# Patient Record
Sex: FEMALE | Race: WHITE | NOT HISPANIC OR LATINO | URBAN - METROPOLITAN AREA
[De-identification: names, ages, dates, MRNs, and addresses within clinical notes are randomized per-mention and may not be internally consistent; named-entity substitution may affect disease eponyms.]

---

## 2017-08-07 ENCOUNTER — OFFICE VISIT (OUTPATIENT)
Dept: FAMILY MEDICINE | Facility: CLINIC | Age: 22
End: 2017-08-07
Payer: OTHER GOVERNMENT

## 2017-08-07 VITALS
OXYGEN SATURATION: 100 % | DIASTOLIC BLOOD PRESSURE: 76 MMHG | WEIGHT: 133.8 LBS | HEIGHT: 64 IN | BODY MASS INDEX: 22.84 KG/M2 | HEART RATE: 86 BPM | TEMPERATURE: 97.6 F | SYSTOLIC BLOOD PRESSURE: 128 MMHG

## 2017-08-07 DIAGNOSIS — F41.9 ANXIETY: ICD-10-CM

## 2017-08-07 DIAGNOSIS — Z00.00 ROUTINE GENERAL MEDICAL EXAMINATION AT A HEALTH CARE FACILITY: Primary | ICD-10-CM

## 2017-08-07 DIAGNOSIS — F41.9 ANXIETY DISORDER, UNSPECIFIED TYPE: ICD-10-CM

## 2017-08-07 LAB
ERYTHROCYTE [DISTWIDTH] IN BLOOD BY AUTOMATED COUNT: 13.3 % (ref 10–15)
HCT VFR BLD AUTO: 40.6 % (ref 35–47)
HGB BLD-MCNC: 13.6 G/DL (ref 11.7–15.7)
MCH RBC QN AUTO: 29.3 PG (ref 26.5–33)
MCHC RBC AUTO-ENTMCNC: 33.5 G/DL (ref 31.5–36.5)
MCV RBC AUTO: 88 FL (ref 78–100)
PLATELET # BLD AUTO: 306 10E9/L (ref 150–450)
RBC # BLD AUTO: 4.64 10E12/L (ref 3.8–5.2)
TSH SERPL DL<=0.005 MIU/L-ACNC: 1.93 MU/L (ref 0.4–4)
WBC # BLD AUTO: 6.4 10E9/L (ref 4–11)

## 2017-08-07 PROCEDURE — 99395 PREV VISIT EST AGE 18-39: CPT | Performed by: INTERNAL MEDICINE

## 2017-08-07 PROCEDURE — 84443 ASSAY THYROID STIM HORMONE: CPT | Performed by: INTERNAL MEDICINE

## 2017-08-07 PROCEDURE — 36415 COLL VENOUS BLD VENIPUNCTURE: CPT | Performed by: INTERNAL MEDICINE

## 2017-08-07 PROCEDURE — 85027 COMPLETE CBC AUTOMATED: CPT | Performed by: INTERNAL MEDICINE

## 2017-08-07 RX ORDER — DOXYCYCLINE 100 MG/1
CAPSULE ORAL
COMMUNITY
Start: 2017-03-09 | End: 2021-03-04

## 2017-08-07 ASSESSMENT — PATIENT HEALTH QUESTIONNAIRE - PHQ9: SUM OF ALL RESPONSES TO PHQ QUESTIONS 1-9: 17

## 2017-08-07 NOTE — PROGRESS NOTES
It was a pleasure to see you.  I wanted to let you know your test results.  Please let me know if you are not able to view them.    Your labs are both normal, your thyroid and complete blood count.  If you have any questions please call me.    Cesario Urban M.D.

## 2017-08-07 NOTE — PATIENT INSTRUCTIONS
Start the zoloft and take 1/2 daily for the first 2 weeks then one daily.  I want you to send me a [employer] message in 4 weeks with an update, but sooner if problems or you get worse.      Preventive Health Recommendations  Female Ages 18 to 25     Yearly exam:     See your health care provider every year in order to  o Review health changes.   o Discuss preventive care.    o Review your medicines if your doctor has prescribed any.      You should be tested each year for STDs (sexually transmitted diseases).       After age 20, talk to your provider about how often you should have cholesterol testing.      Starting at age 21, get a Pap test every three years. If you have an abnormal result, your doctor may have you test more often.      If you are at risk for diabetes, you should have a diabetes test (fasting glucose).     Shots:     Get a flu shot each year.     Get a tetanus shot every 10 years.     Consider getting the shot (vaccine) that prevents cervical cancer (Gardasil).    Nutrition:     Eat at least 5 servings of fruits and vegetables each day.    Eat whole-grain bread, whole-wheat pasta and brown rice instead of white grains and rice.    Talk to your provider about Calcium and Vitamin D.     Lifestyle    Exercise at least 150 minutes a week each week (30 minutes a day, 5 days a week). This will help you control your weight and prevent disease.    Limit alcohol to one drink per day.    No smoking.     Wear sunscreen to prevent skin cancer.    See your dentist every six months for an exam and cleaning.

## 2017-08-07 NOTE — PROGRESS NOTES
SUBJECTIVE:   CC: Shanita Negrete is an 22 year old woman who presents for preventive health visit.     She is going to school soon, leana at [employer], studying economics, working 2 jobs this summer.    She is sexually active, plans to do std testing with gyn coming up soon.    She has anxiety for long time, occasionally has panic attacks, anxiety affecting her and affects sleep, worries about things a lot, some mild depression but not bad.  Can get panic under stress, never on meds before.  Tried counseling at school last year but not that helpful.  Here with mother today.  No drug use, no significant alcohol, no abuse issues.  No other c/o on review of systems, works out some.    Healthy Habits:    Do you get at least three servings of calcium containing foods daily (dairy, green leafy vegetables, etc.)? yes    Amount of exercise or daily activities, outside of work: 0 day(s) per week    Problems taking medications regularly No    Medication side effects: No    Have you had an eye exam in the past two years? yes    Do you see a dentist twice per year? yes    Do you have sleep apnea, excessive snoring or daytime drowsiness?no            Today's PHQ-2 Score:   PHQ-2 ( 1999 Pfizer) 8/4/2016 7/6/2015   Q1: Little interest or pleasure in doing things 0 0   Q2: Feeling down, depressed or hopeless 0 0   PHQ-2 Score 0 0         Abuse: Current or Past(Physical, Sexual or Emotional)- No  Do you feel safe in your environment - Yes  Social History   Substance Use Topics     Smoking status: Never Smoker     Smokeless tobacco: Never Used     Alcohol use No     The patient does not drink >3 drinks per day nor >7 drinks per week.                Past Medical History:      Past Medical History:   Diagnosis Date     Acetabular labrum tear              Past Surgical History:      Past Surgical History:   Procedure Laterality Date     REMOVAL OF NAIL BED  2003             Social History:     Social History  "    Social History     Marital status: Single     Spouse name: N/A     Number of children: 0     Years of education: N/A     Occupational History     student      Social History Main Topics     Smoking status: Never Smoker     Smokeless tobacco: Never Used     Alcohol use 0.0 oz/week     0 Standard drinks or equivalent per week     Drug use: No     Sexual activity: Yes     Partners: Male     Birth control/ protection: Pill     Other Topics Concern     Not on file     Social History Narrative             Family History:   reviewed         Allergies:     Allergies   Allergen Reactions     Penicillins Hives             Medications:     Current Outpatient Prescriptions   Medication Sig Dispense Refill     doxycycline (VIBRAMYCIN) 100 MG capsule        sertraline (ZOLOFT) 50 MG tablet Take 1/2 tablet daily for the first 2 weeks then go to one full table daily 30 tablet 1     levonorgestrel-ethinyl estradiol (AVIANE,ALESSE,LESSINA) 0.1-20 MG-MCG per tablet Take 1 tablet by mouth daily 28 tablet 1               Review of Systems:   The 10 point Review of Systems is negative other than noted in the HPI           Physical Exam:   Blood pressure 128/76, pulse 86, temperature 97.6  F (36.4  C), temperature source Oral, height 5' 3.75\" (1.619 m), weight 133 lb 12.8 oz (60.7 kg), last menstrual period 07/26/2017, SpO2 100 %, not currently breastfeeding.    Exam:  Constitutional: healthy appearing, alert and in no distress  Heent: Normocephalic. Head without obvious masses or lesions. PERRLDC, EOMI. Mouth exam within normal limits: tongue, mucous membranes, posterior pharynx all normal, no lesions or abnormalities seen.  Tm's and canals within normal limits bilaterally. Neck supple, no nuchal rigidity or masses. No supraclavicular, or cervical adenopathy. Thyroid symmetric, no masses.  Cardiovascular: Regular rate and rhythm, no murmer, rub or gallops.  JVP not elevated, no edema.  Carotids within normal limits bilaterally, no " bruits.  Respiratory: Normal respiratory effort.  Lungs clear, normal flow, no wheezing or crackles.  Gastrointestinal: Normal active bowel sounds.   Soft, not tender, no masses, guarding or rebound.  No hepatosplenomegaly.   Musculoskeletal: extremities normal, no gross deformities noted.  Skin: no suspicious lesions or rashes   Neurologic: Mental status within normal limits.  Speech fluent.  No gross motor abnormalities and gait intact.  Psychiatric: mentation appears normal and affect normal.         Data:   Labs sent        Assessment:   1. Normal cpx  2. Anxiety disorder, mild depression due to anxiety  3. hcm         Plan:   To see gyn, doing std testing there  Exercise, diet  Letter with labs  Add zoloft 25mg for 2 weeks then 50mg, send me update 4 weeks, call if side effects or worsens      Cesario Urban M.D.

## 2017-08-07 NOTE — MR AVS SNAPSHOT
After Visit Summary   8/7/2017    Shanita Negrete    MRN: [id number]           Patient Information     Date Of Birth          1995        Visit Information        Provider Department      8/7/2017 9:00 AM Cesario Urban MD [facility]        Today's Diagnoses     Routine general medical examination at a health care facility    -  1    Anxiety          Care Instructions    Start the zoloft and take 1/2 daily for the first 2 weeks then one daily.  I want you to send me a [employer] message in 4 weeks with an update, but sooner if problems or you get worse.      Preventive Health Recommendations  Female Ages 18 to 25     Yearly exam:     See your health care provider every year in order to  o Review health changes.   o Discuss preventive care.    o Review your medicines if your doctor has prescribed any.      You should be tested each year for STDs (sexually transmitted diseases).       After age 20, talk to your provider about how often you should have cholesterol testing.      Starting at age 21, get a Pap test every three years. If you have an abnormal result, your doctor may have you test more often.      If you are at risk for diabetes, you should have a diabetes test (fasting glucose).     Shots:     Get a flu shot each year.     Get a tetanus shot every 10 years.     Consider getting the shot (vaccine) that prevents cervical cancer (Gardasil).    Nutrition:     Eat at least 5 servings of fruits and vegetables each day.    Eat whole-grain bread, whole-wheat pasta and brown rice instead of white grains and rice.    Talk to your provider about Calcium and Vitamin D.     Lifestyle    Exercise at least 150 minutes a week each week (30 minutes a day, 5 days a week). This will help you control your weight and prevent disease.    Limit alcohol to one drink per day.    No smoking.     Wear sunscreen to prevent skin cancer.    See your dentist every six months for an exam and  "cleaning.          Follow-ups after your visit        Who to contact     If you have questions or need follow up information about today's clinic visit or your schedule please contact [facility] directly at [phone].  Normal or non-critical lab and imaging results will be communicated to you by MyChart, letter or phone within 4 business days after the clinic has received the results. If you do not hear from us within 7 days, please contact the clinic through MyChart or phone. If you have a critical or abnormal lab result, we will notify you by phone as soon as possible.  Submit refill requests through [email or web address] or call your pharmacy and they will forward the refill request to us. Please allow 3 business days for your refill to be completed.          Additional Information About Your Visit        [employer] Information     [email or web address] gives you secure access to your electronic health record. If you see a primary care provider, you can also send messages to your care team and make appointments. If you have questions, please call your primary care clinic.  If you do not have a primary care provider, please call [phone] and they will assist you.        Care EveryWhere ID     This is your Care EveryWhere ID. This could be used by other organizations to access your [facility] medical records  [id number]        Your Vitals Were     Pulse Temperature Height Last Period Pulse Oximetry Breastfeeding?    86 97.6  F (36.4  C) (Oral) 5' 3.75\" (1.619 m) 07/26/2017 100% No    BMI (Body Mass Index)                   23.15 kg/m2            Blood Pressure from Last 3 Encounters:   08/07/17 128/76   08/04/16 118/77   07/06/15 110/70    Weight from Last 3 Encounters:   08/07/17 133 lb 12.8 oz (60.7 kg)   08/04/16 118 lb (53.5 kg) (31 %)*   07/06/15 118 lb 12.8 oz (53.9 kg) (38 %)*     * Growth percentiles are based on CDC 2-20 Years data.              We Performed the Following     CBC with platelets     TSH with free T4 " reflex          Today's Medication Changes          These changes are accurate as of: 8/7/17  9:22 AM.  If you have any questions, ask your nurse or doctor.               Start taking these medicines.        Dose/Directions    sertraline 50 MG tablet   Commonly known as:  ZOLOFT   Used for:  Anxiety   Started by:  Cesario Urban MD        Take 1/2 tablet daily for the first 2 weeks then go to one full table daily   Quantity:  30 tablet   Refills:  1            Where to get your medicines      These medications were sent to [id number] - [place], [place] - [id number]  [address] [facility] [address]     Phone:  [phone]     sertraline 50 MG tablet                Primary Care Provider Office Phone # Fax #    Cesario Urban [phone]       [facility] [address] [facility] [address]  [facility] [address]        Equal Access to Services     [facility] : Hadii aad ku hadasho Soomaali, waaxda luqadaha, qaybta kaalmada adeegyada, waxay idiin hayaan lyudmila nielson . So [facility] [phone].    ATENCIÓN: Si habla español, tiene a hastings disposición servicios gratuitos de asistencia lingüística. Aaliyah al [phone].    We comply with applicable federal civil rights laws and [place] laws. We do not discriminate on the basis of race, color, national origin, age, disability sex, sexual orientation or gender identity.            Thank you!     Thank you for choosing [facility]  for your care. Our goal is always to provide you with excellent care. Hearing back from our patients is one way we can continue to improve our services. Please take a few minutes to complete the written survey that you may receive in the mail after your visit with us. Thank you!             Your Updated Medication List - Protect others around you: Learn how to safely use, store and throw away your medicines at www.disposemymeds.org.          This list is accurate as of: 8/7/17   9:22 AM.  Always use your most recent med list.                   Brand Name Dispense Instructions for use Diagnosis    doxycycline 100 MG capsule    VIBRAMYCIN          levonorgestrel-ethinyl estradiol 0.1-20 MG-MCG per tablet    KO REYNAGA LESSINA    28 tablet    Take 1 tablet by mouth daily    Routine general medical examination at a health care facility       sertraline 50 MG tablet    ZOLOFT    30 tablet    Take 1/2 tablet daily for the first 2 weeks then go to one full table daily    Anxiety

## 2017-08-07 NOTE — NURSING NOTE
"Chief Complaint   Patient presents with     Physical       Initial /76 (BP Location: Right arm, Patient Position: Chair, Cuff Size: Adult Regular)  Pulse 86  Temp 97.6  F (36.4  C) (Oral)  Ht 5' 3.75\" (1.619 m)  Wt 133 lb 12.8 oz (60.7 kg)  LMP 07/26/2017  SpO2 100%  Breastfeeding? No  BMI 23.15 kg/m2 Estimated body mass index is 23.15 kg/(m^2) as calculated from the following:    Height as of this encounter: 5' 3.75\" (1.619 m).    Weight as of this encounter: 133 lb 12.8 oz (60.7 kg).  Medication Reconciliation: complete.  Ana Gupta CMA    "

## 2018-03-24 ENCOUNTER — HEALTH MAINTENANCE LETTER (OUTPATIENT)
Age: 23
End: 2018-03-24

## 2018-04-14 ENCOUNTER — HEALTH MAINTENANCE LETTER (OUTPATIENT)
Age: 23
End: 2018-04-14

## 2018-06-02 ENCOUNTER — HEALTH MAINTENANCE LETTER (OUTPATIENT)
Age: 23
End: 2018-06-02

## 2018-08-02 NOTE — PROGRESS NOTES
SUBJECTIVE:   CC: Shanita Negrete is an 23 year old woman who presents for preventive health visit.     Overall the patient is doing well but she is struggling somewhat with her anxiety and mild depression.  She had some difficulty last year at school.  It sounds like her and her roommates were not a great fit.  She has been working 2 jobs this summer.  She is going back to school next Wednesday and has a counselor there that she really likes and plans to follow-up with her.  She is not always taking her Zoloft regularly.  She is getting on and doing things.  She is down at times but not overtly and not suicidal.  Occasionally she can have some anxiety.  Her weight is up and she has not been exercising.  She does not have any physical symptoms except she did hit her head at work on Monday.  It was no loss of consciousness.  She has had slight nausea and blurred vision after but not bad.  She has no other complaints.  She is single but has had a boyfriend for a year although he was recently deployed.  There are no abuse issues.  She has no history of STDs.  She is sexually active.  She does not use drugs.      Healthy Habits:    Do you get at least three servings of calcium containing foods daily (dairy, green leafy vegetables, etc.)? yes    Amount of exercise or daily activities, outside of work: 7 day(s) per week    Problems taking medications regularly No    Medication side effects: No    Have you had an eye exam in the past two years? yes    Do you see a dentist twice per year? yes    Do you have sleep apnea, excessive snoring or daytime drowsiness?no          Today's PHQ-2 Score:   PHQ-2 ( 1999 Pfizer) 8/4/2016 7/6/2015   Q1: Little interest or pleasure in doing things 0 0   Q2: Feeling down, depressed or hopeless 0 0   PHQ-2 Score 0 0       Abuse: Current or Past(Physical, Sexual or Emotional)- No  Do you feel safe in your environment - Yes    Social History   Substance Use Topics     Smoking status:  Never Smoker     Smokeless tobacco: Never Used     Alcohol use 0.0 oz/week     0 Standard drinks or equivalent per week     If you drink alcohol do you typically have >3 drinks per day or >7 drinks per week? No                                  Past Medical History:      Past Medical History:   Diagnosis Date     Acetabular labrum tear      Anxiety disorder     added zoloft 8/7/17             Past Surgical History:      Past Surgical History:   Procedure Laterality Date     REMOVAL OF NAIL BED  2003             Social History:     Social History     Social History     Marital status: Single     Spouse name: N/A     Number of children: 0     Years of education: N/A     Occupational History     student      Social History Main Topics     Smoking status: Never Smoker     Smokeless tobacco: Never Used     Alcohol use 0.0 oz/week     0 Standard drinks or equivalent per week      Comment: occ     Drug use: No     Sexual activity: Yes     Partners: Male     Birth control/ protection: Pill     Other Topics Concern     Not on file     Social History Narrative             Family History:   reviewed         Allergies:     Allergies   Allergen Reactions     Penicillins Hives             Medications:     Current Outpatient Prescriptions   Medication Sig Dispense Refill     doxycycline (VIBRAMYCIN) 100 MG capsule        levonorgestrel-ethinyl estradiol (AVIANE,ALESSE,LESSINA) 0.1-20 MG-MCG per tablet Take 1 tablet by mouth daily 28 tablet 1     sertraline (ZOLOFT) 50 MG tablet Take 1 tablet (50 mg) by mouth daily Take 1/2 tablet daily for the first 2 weeks then go to one full table daily 90 tablet 1     [DISCONTINUED] sertraline (ZOLOFT) 50 MG tablet Take 1 tablet (50 mg) by mouth daily Take 1/2 tablet daily for the first 2 weeks then go to one full table daily 90 tablet 1               Review of Systems:   The 10 point Review of Systems is negative other than noted in the HPI           Physical Exam:   Blood pressure 136/86,  "pulse 105, temperature 97.8  F (36.6  C), temperature source Oral, height 5' 3.75\" (1.619 m), weight 144 lb (65.3 kg), SpO2 100 %, not currently breastfeeding.    Exam:  Constitutional: healthy appearing, alert and in no distress  Heent: Normocephalic. Head without obvious masses or lesions. PERRLDC, EOMI. Mouth exam within normal limits: tongue, mucous membranes, posterior pharynx all normal, no lesions or abnormalities seen.  Tm's and canals within normal limits bilaterally. Neck supple, no nuchal rigidity or masses. No supraclavicular, or cervical adenopathy. Thyroid symmetric, no masses.  Cardiovascular: Regular rate and rhythm, no murmer, rub or gallops.  JVP not elevated, no edema.  Carotids within normal limits bilaterally, no bruits.  Respiratory: Normal respiratory effort.  Lungs clear, normal flow, no wheezing or crackles.  Breasts: Normal bilaterally.  No masses or lesions.  Nipples within normal limits.  No axillary lesions or nodes.  My M.A. Was present during this part of the examination.  Gastrointestinal: Normal active bowel sounds.   Soft, not tender, no masses, guarding or rebound.  No hepatosplenomegaly.   Musculoskeletal: extremities normal, no gross deformities noted.  Skin: no suspicious lesions or rashes   Neurologic: Mental status within normal limits.  Speech fluent.  No gross motor abnormalities and gait intact.  Psychiatric: mentation appears normal and affect normal.         Data:   Labs sent        Assessment:   1. Normal complete physical exam  2. Anxiety, mild depression, panic attacks, suspect in part due to school, prior rooming situation.  Discussed with patient the need to exercise, take her med reg.  She has a good counselor she likes and will be going back to school soon.  She will take her meds, see the counselor and call if she worsens at all.  She will send me a message in 4 weeks or so  3. Weight gain, needs exercise, diet  4. hcm         Plan:   Std testing today  Pap " today  Exercise, diet  Plan as above  tdap today        Cesario Urban M.D.

## 2018-08-03 ENCOUNTER — OFFICE VISIT (OUTPATIENT)
Dept: FAMILY MEDICINE | Facility: CLINIC | Age: 23
End: 2018-08-03
Payer: OTHER GOVERNMENT

## 2018-08-03 VITALS
SYSTOLIC BLOOD PRESSURE: 136 MMHG | TEMPERATURE: 97.8 F | HEART RATE: 105 BPM | WEIGHT: 144 LBS | OXYGEN SATURATION: 100 % | BODY MASS INDEX: 24.59 KG/M2 | HEIGHT: 64 IN | DIASTOLIC BLOOD PRESSURE: 86 MMHG

## 2018-08-03 DIAGNOSIS — Z00.00 ROUTINE GENERAL MEDICAL EXAMINATION AT A HEALTH CARE FACILITY: Primary | ICD-10-CM

## 2018-08-03 DIAGNOSIS — F41.9 ANXIETY DISORDER, UNSPECIFIED TYPE: ICD-10-CM

## 2018-08-03 DIAGNOSIS — Z23 NEED FOR VACCINATION: ICD-10-CM

## 2018-08-03 DIAGNOSIS — F41.9 ANXIETY: ICD-10-CM

## 2018-08-03 LAB
CHOLEST SERPL-MCNC: 173 MG/DL
ERYTHROCYTE [DISTWIDTH] IN BLOOD BY AUTOMATED COUNT: 13.2 % (ref 10–15)
GLUCOSE SERPL-MCNC: 67 MG/DL (ref 70–99)
HCT VFR BLD AUTO: 41.5 % (ref 35–47)
HDLC SERPL-MCNC: 81 MG/DL
HGB BLD-MCNC: 14.3 G/DL (ref 11.7–15.7)
LDLC SERPL CALC-MCNC: 74 MG/DL
MCH RBC QN AUTO: 30.1 PG (ref 26.5–33)
MCHC RBC AUTO-ENTMCNC: 34.5 G/DL (ref 31.5–36.5)
MCV RBC AUTO: 87 FL (ref 78–100)
NONHDLC SERPL-MCNC: 92 MG/DL
PLATELET # BLD AUTO: 330 10E9/L (ref 150–450)
RBC # BLD AUTO: 4.75 10E12/L (ref 3.8–5.2)
TRIGL SERPL-MCNC: 90 MG/DL
TSH SERPL DL<=0.005 MIU/L-ACNC: 2.54 MU/L (ref 0.4–4)
WBC # BLD AUTO: 15.1 10E9/L (ref 4–11)

## 2018-08-03 PROCEDURE — 80061 LIPID PANEL: CPT | Performed by: INTERNAL MEDICINE

## 2018-08-03 PROCEDURE — 85027 COMPLETE CBC AUTOMATED: CPT | Performed by: INTERNAL MEDICINE

## 2018-08-03 PROCEDURE — G0145 SCR C/V CYTO,THINLAYER,RESCR: HCPCS | Performed by: INTERNAL MEDICINE

## 2018-08-03 PROCEDURE — 90715 TDAP VACCINE 7 YRS/> IM: CPT | Performed by: INTERNAL MEDICINE

## 2018-08-03 PROCEDURE — 99395 PREV VISIT EST AGE 18-39: CPT | Mod: 25 | Performed by: INTERNAL MEDICINE

## 2018-08-03 PROCEDURE — 87591 N.GONORRHOEAE DNA AMP PROB: CPT | Performed by: INTERNAL MEDICINE

## 2018-08-03 PROCEDURE — 36415 COLL VENOUS BLD VENIPUNCTURE: CPT | Performed by: INTERNAL MEDICINE

## 2018-08-03 PROCEDURE — 90471 IMMUNIZATION ADMIN: CPT | Performed by: INTERNAL MEDICINE

## 2018-08-03 PROCEDURE — 82947 ASSAY GLUCOSE BLOOD QUANT: CPT | Performed by: INTERNAL MEDICINE

## 2018-08-03 PROCEDURE — 87491 CHLMYD TRACH DNA AMP PROBE: CPT | Performed by: INTERNAL MEDICINE

## 2018-08-03 PROCEDURE — 84443 ASSAY THYROID STIM HORMONE: CPT | Performed by: INTERNAL MEDICINE

## 2018-08-03 NOTE — MR AVS SNAPSHOT
After Visit Summary   8/3/2018    Shanita Negrete    MRN: [id number]           Patient Information     Date Of Birth          1995        Visit Information        Provider Department      8/3/2018 8:30 AM Cesario Urban MD [facility]        Today's Diagnoses     Routine general medical examination at a health care facility    -  1    Anxiety disorder, unspecified type        Anxiety          Care Instructions      Preventive Health Recommendations  Female Ages 21 to 25     Yearly exam:     See your health care provider every year in order to  o Review health changes.   o Discuss preventive care.    o Review your medicines if your doctor has prescribed any.      You should be tested each year for STDs (sexually transmitted diseases).       Talk to your provider about how often you should have cholesterol testing.      Get a Pap test every three years. If you have an abnormal result, your doctor may have you test more often.      If you are at risk for diabetes, you should have a diabetes test (fasting glucose).     Shots:     Get a flu shot each year.     Get a tetanus shot every 10 years.     Consider getting the shot (vaccine) that prevents cervical cancer (Gardasil).    Nutrition:     Eat at least 5 servings of fruits and vegetables each day.    Eat whole-grain bread, whole-wheat pasta and brown rice instead of white grains and rice.    Get adequate Calcium and Vitamin D.     Lifestyle    Exercise at least 150 minutes a week each week (30 minutes a day, 5 days a week). This will help you control your weight and prevent disease.    Limit alcohol to one drink per day.    No smoking.     Wear sunscreen to prevent skin cancer.    See your dentist every six months for an exam and cleaning.          Follow-ups after your visit        Who to contact     If you have questions or need follow up information about today's clinic visit or your schedule please contact [place]  [facility] directly at [phone].  Normal or non-critical lab and imaging results will be communicated to you by MyChart, letter or phone within 4 business days after the clinic has received the results. If you do not hear from us within 7 days, please contact the clinic through [employer] or phone. If you have a critical or abnormal lab result, we will notify you by phone as soon as possible.  Submit refill requests through [employer] or call your pharmacy and they will forward the refill request to us. Please allow 3 business days for your refill to be completed.          Additional Information About Your Visit        [employer] Information     [employer] gives you secure access to your electronic health record. If you see a primary care provider, you can also send messages to your care team and make appointments. If you have questions, please call your primary care clinic.  If you do not have a primary care provider, please call [phone] and they will assist you.        Care EveryWhere ID     This is your Care EveryWhere ID. This could be used by other organizations to access your [facility] medical records  [id number]        Your Vitals Were     Pulse Temperature Height Pulse Oximetry Breastfeeding? BMI (Body Mass Index)    105 97.8  F (36.6  C) (Oral) 5' 3.75\" (1.619 m) 100% No 24.91 kg/m2       Blood Pressure from Last 3 Encounters:   08/03/18 136/86   08/07/17 128/76   08/04/16 118/77    Weight from Last 3 Encounters:   08/03/18 144 lb (65.3 kg)   08/07/17 133 lb 12.8 oz (60.7 kg)   08/04/16 118 lb (53.5 kg) (31 %)*     * Growth percentiles are based on CDC 2-20 Years data.              We Performed the Following     CBC with platelets     Chlamydia trachomatis PCR     Glucose     Lipid panel reflex to direct LDL Non-fasting     Neisseria gonorrhoeae PCR     Pap imaged thin layer screen only - recommended age 21 - 24 years     TDAP, IM (10 - 64 YRS) - Adacel     TSH with free T4 reflex          Where to get " your medicines      These medications were sent to [id number] - [place], [place] - [address] [address]     Phone:  [phone]     sertraline 50 MG tablet          Primary Care Provider Office Phone # Fax #    Cesario Urban [phone] [address] [facility] [address]  [facility] [address]        Equal Access to Services     [facility] : Hadii aad ku hadasho Soomaali, waaxda luqadaha, qaybta kaalmada adeegyada, waxay idiin hayaan adeeg kharash larenetta . So [facility] [phone].    ATENCIÓN: Si lianna hawkins, tiene a hastings disposición servicios gratuitos de asistencia lingüística. [facility] [phone].    We comply with applicable federal civil rights laws and [place] laws. We do not discriminate on the basis of race, color, national origin, age, disability, sex, sexual orientation, or gender identity.            Thank you!     Thank you for choosing [facility]  for your care. Our goal is always to provide you with excellent care. Hearing back from our patients is one way we can continue to improve our services. Please take a few minutes to complete the written survey that you may receive in the mail after your visit with us. Thank you!             Your Updated Medication List - Protect others around you: Learn how to safely use, store and throw away your medicines at www.disposemymeds.org.          This list is accurate as of 8/3/18  8:56 AM.  Always use your most recent med list.                   Brand Name Dispense Instructions for use Diagnosis    doxycycline 100 MG capsule    VIBRAMYCIN          levonorgestrel-ethinyl estradiol 0.1-20 MG-MCG per tablet    KO REYNAGA LESSINA    28 tablet    Take 1 tablet by mouth daily    Routine general medical examination at a health care facility       sertraline 50 MG tablet    ZOLOFT    90 tablet    Take 1 tablet (50 mg) by mouth daily Take 1/2 tablet daily for the first 2 weeks then go to one  full table daily    Anxiety

## 2018-08-03 NOTE — NURSING NOTE
Screening Questionnaire for Adult Immunization    Are you sick today?   No   Do you have allergies to medications, food, a vaccine component or latex?   No   Have you ever had a serious reaction after receiving a vaccination?   No   Do you have a long-term health problem with heart disease, lung disease, asthma, kidney disease, metabolic disease (e.g. diabetes), anemia, or other blood disorder?   No   Do you have cancer, leukemia, HIV/AIDS, or any other immune system problem?   No   In the past 3 months, have you taken medications that affect  your immune system, such as prednisone, other steroids, or anticancer drugs; drugs for the treatment of rheumatoid arthritis, Crohn s disease, or psoriasis; or have you had radiation treatments?   No   Have you had a seizure, or a brain or other nervous system problem?   No   During the past year, have you received a transfusion of blood or blood     products, or been given immune (gamma) globulin or antiviral drug?   No   For women: Are you pregnant or is there a chance you could become        pregnant during the next month?   No   Have you received any vaccinations in the past 4 weeks?   No     Immunization questionnaire answers were all negative.        Per orders of Dr. Urban, injection of TDAP given by Lotus Ott. Patient instructed to remain in clinic for 15 minutes afterwards, and to report any adverse reaction to me immediately.       Screening performed by Lotus Ott on 8/3/2018 at 9:13 AM.  Prior to injection verified patient identity using patient's name and date of birth.  Due to injection administration, patient instructed to remain in clinic for 15 minutes  afterwards, and to report any adverse reaction to me immediately.

## 2018-08-05 LAB
C TRACH DNA SPEC QL NAA+PROBE: NEGATIVE
N GONORRHOEA DNA SPEC QL NAA+PROBE: NEGATIVE
SPECIMEN SOURCE: NORMAL
SPECIMEN SOURCE: NORMAL

## 2018-08-05 NOTE — PROGRESS NOTES
It was a please seeing you.  You should be able to view your labs.    For the most part your labs look very good.  No signs of chlamydia or gonorrhea.  Your thyroid is normal and your cholesterol is super.      I am not sure why your white count is elevated.  This can indicate an infection but in your case you did not mention any signs of an infection so I suspect it is simply lab variation.  To be safe I would ask that you stop in before you leave for school to repeat it.  You do not need to fast or see me but please call ahead.  If you are having signs of an infection let me know.    Your health looks very good.    Cesario Urban M.D.

## 2018-08-07 LAB
COPATH REPORT: NORMAL
PAP: NORMAL

## 2018-08-13 ENCOUNTER — DOCUMENTATION ONLY (OUTPATIENT)
Dept: LAB | Facility: CLINIC | Age: 23
End: 2018-08-13

## 2018-08-13 DIAGNOSIS — R79.89 ABNORMAL CBC: Primary | ICD-10-CM

## 2018-08-13 DIAGNOSIS — R79.89 ABNORMAL CBC: ICD-10-CM

## 2018-08-13 LAB
BASOPHILS # BLD AUTO: 0 10E9/L (ref 0–0.2)
BASOPHILS NFR BLD AUTO: 0.2 %
DIFFERENTIAL METHOD BLD: NORMAL
EOSINOPHIL # BLD AUTO: 0.2 10E9/L (ref 0–0.7)
EOSINOPHIL NFR BLD AUTO: 1.9 %
ERYTHROCYTE [DISTWIDTH] IN BLOOD BY AUTOMATED COUNT: 12.5 % (ref 10–15)
HCT VFR BLD AUTO: 40 % (ref 35–47)
HGB BLD-MCNC: 13.5 G/DL (ref 11.7–15.7)
LYMPHOCYTES # BLD AUTO: 3.3 10E9/L (ref 0.8–5.3)
LYMPHOCYTES NFR BLD AUTO: 34.1 %
MCH RBC QN AUTO: 29.9 PG (ref 26.5–33)
MCHC RBC AUTO-ENTMCNC: 33.8 G/DL (ref 31.5–36.5)
MCV RBC AUTO: 89 FL (ref 78–100)
MONOCYTES # BLD AUTO: 1.1 10E9/L (ref 0–1.3)
MONOCYTES NFR BLD AUTO: 11.2 %
NEUTROPHILS # BLD AUTO: 5 10E9/L (ref 1.6–8.3)
NEUTROPHILS NFR BLD AUTO: 52.6 %
PLATELET # BLD AUTO: 410 10E9/L (ref 150–450)
RBC # BLD AUTO: 4.52 10E12/L (ref 3.8–5.2)
WBC # BLD AUTO: 9.6 10E9/L (ref 4–11)

## 2018-08-13 PROCEDURE — 85025 COMPLETE CBC W/AUTO DIFF WBC: CPT | Performed by: INTERNAL MEDICINE

## 2018-08-13 PROCEDURE — 36415 COLL VENOUS BLD VENIPUNCTURE: CPT | Performed by: INTERNAL MEDICINE

## 2018-08-13 NOTE — PROGRESS NOTES
Shanita,    Your repeat cbc is completely normal so no problems.  Sorry about the hassle.    Cesario Urban M.D.

## 2018-09-06 ENCOUNTER — MYC MEDICAL ADVICE (OUTPATIENT)
Dept: FAMILY MEDICINE | Facility: CLINIC | Age: 23
End: 2018-09-06

## 2018-09-06 NOTE — LETTER
September 7, 2018      [facility]te  [address] [facility] [address]    Attention: [employer]     To whom it may concern:     Shanita is currently under my care for anxiety. She struggles with her anxiety, and it can make seemingly simple tasks difficult at times. I feel that her getting an emotional support animal could help her live more independently.       Sincerely,      Cesario Urban MD

## 2018-09-06 NOTE — TELEPHONE ENCOUNTER
To PCP:     Please see message below. Please let us know if we can help to prep letter/assist. Cannot find information on what forms were regarding.     Thank you,   Tiffany HO RN

## 2018-09-07 NOTE — TELEPHONE ENCOUNTER
PCP see below     Drafted letter in this encounter     Please review/edit     Thank you,     Nanda BENTLEY RN

## 2018-09-10 NOTE — TELEPHONE ENCOUNTER
Called pt- no answer, left VM stating letter has been sent in the mail and unable to fax with Dr. Urban's signature since he is gone. If needed, pt will call back and we could possible get DOD to sign and fax letter.    Devin MCGUIRE RN

## 2019-08-05 ENCOUNTER — OFFICE VISIT (OUTPATIENT)
Dept: FAMILY MEDICINE | Facility: CLINIC | Age: 24
End: 2019-08-05
Payer: OTHER GOVERNMENT

## 2019-08-05 VITALS
BODY MASS INDEX: 24.59 KG/M2 | OXYGEN SATURATION: 98 % | HEIGHT: 64 IN | WEIGHT: 144 LBS | HEART RATE: 99 BPM | TEMPERATURE: 97 F | DIASTOLIC BLOOD PRESSURE: 81 MMHG | SYSTOLIC BLOOD PRESSURE: 114 MMHG

## 2019-08-05 DIAGNOSIS — F41.9 ANXIETY: ICD-10-CM

## 2019-08-05 DIAGNOSIS — Z00.00 ROUTINE GENERAL MEDICAL EXAMINATION AT A HEALTH CARE FACILITY: Primary | ICD-10-CM

## 2019-08-05 PROCEDURE — 99395 PREV VISIT EST AGE 18-39: CPT | Performed by: INTERNAL MEDICINE

## 2019-08-05 RX ORDER — LEVONORGESTREL/ETHIN.ESTRADIOL 0.1-0.02MG
1 TABLET ORAL DAILY
Qty: 90 TABLET | Refills: 3 | Status: SHIPPED | OUTPATIENT
Start: 2019-08-05 | End: 2020-06-17

## 2019-08-05 ASSESSMENT — MIFFLIN-ST. JEOR: SCORE: 1394.21

## 2019-08-05 NOTE — PROGRESS NOTES
SUBJECTIVE:   CC: Shanita Negrete is an 24 year old woman who presents for preventive health visit.     The patient is doing super.  She works out regularly.  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  She has no complaints and her anxiety is controlled.    She is sexually active with the same person for well over the years it does not feel the need for STD testing.    Healthy Habits:    Getting at least 3 servings of Calcium per day:  Yes    Bi-annual eye exam:  Yes    Dental care twice a year:  Yes    Sleep apnea or symptoms of sleep apnea:  None    Diet:  Regular (no restrictions)    Frequency of exercise:  4-5 days/week    Duration of exercise:  45-60 minutes    Taking medications regularly:  Yes    Barriers to taking medications:  Not applicable    Medication side effects:  Not applicable    PHQ-2 Total Score:    Additional concerns today:  No              Today's PHQ-2 Score:   PHQ-2 ( 1999 Pfizer) 8/3/2018   Q1: Little interest or pleasure in doing things 0   Q2: Feeling down, depressed or hopeless 0   PHQ-2 Score 0       Abuse: Current or Past(Physical, Sexual or Emotional)- No  Do you feel safe in your environment? Yes    Social History     Tobacco Use     Smoking status: Never Smoker     Smokeless tobacco: Never Used   Substance Use Topics     Alcohol use: Yes     Alcohol/week: 0.0 oz     Comment: occ     If you drink alcohol do you typically have >3 drinks per day or >7 drinks per week? No                 Past Medical History:      Past Medical History:   Diagnosis Date     Acetabular labrum tear      Anxiety disorder     added zoloft 8/7/17             Past Surgical History:      Past Surgical History:   Procedure Laterality Date     REMOVAL OF NAIL BED  2003             Social History:     Social History     Socioeconomic History     Marital status: Single     Spouse name: Not on file     Number of children: 0     Years of education: Not on file     Highest education level: Not on  file   Occupational History     Occupation: works cost analysis   Social Needs     Financial resource strain: Not on file     Food insecurity:     Worry: Not on file     Inability: Not on file     Transportation needs:     Medical: Not on file     Non-medical: Not on file   Tobacco Use     Smoking status: Never Smoker     Smokeless tobacco: Never Used   Substance and Sexual Activity     Alcohol use: Yes     Alcohol/week: 0.0 oz     Comment: occ     Drug use: No     Sexual activity: Yes     Partners: Male     Birth control/protection: Pill   Lifestyle     Physical activity:     Days per week: Not on file     Minutes per session: Not on file     Stress: Not on file   Relationships     Social connections:     Talks on phone: Not on file     Gets together: Not on file     Attends [employer]al service: Not on file     Active member of club or organization: Not on file     Attends meetings of clubs or organizations: Not on file     Relationship status: Not on file     Intimate partner violence:     Fear of current or ex partner: Not on file     Emotionally abused: Not on file     Physically abused: Not on file     Forced sexual activity: Not on file   Other Topics Concern     Parent/sibling w/ CABG, MI or angioplasty before 65F 55M? Not Asked   Social History Narrative     Not on file             Family History:   reviewed         Allergies:     Allergies   Allergen Reactions     Penicillins Hives             Medications:     Current Outpatient Medications   Medication Sig Dispense Refill     levonorgestrel-ethinyl estradiol (AVIANE/ALESSE/LESSINA) 0.1-20 MG-MCG tablet Take 1 tablet by mouth daily 90 tablet 3     sertraline (ZOLOFT) 50 MG tablet Take 1 tablet (50 mg) by mouth daily Take 1/2 tablet daily for the first 2 weeks then go to one full table daily 90 tablet 3     doxycycline (VIBRAMYCIN) 100 MG capsule                  Review of Systems:   The 10 point Review of Systems is negative other than noted in the HPI         "   Physical Exam:   Blood pressure 114/81, pulse 99, temperature 97  F (36.1  C), temperature source Oral, height 1.619 m (5' 3.75\"), weight 65.3 kg (144 lb), SpO2 98 %, not currently breastfeeding.    Exam:  Constitutional: healthy appearing, alert and in no distress  Heent: Normocephalic. Head without obvious masses or lesions. PERRLDC, EOMI. Mouth exam within normal limits: tongue, mucous membranes, posterior pharynx all normal, no lesions or abnormalities seen.  Tm's and canals within normal limits bilaterally. Neck supple, no nuchal rigidity or masses. No supraclavicular, or cervical adenopathy. Thyroid symmetric, no masses.  Cardiovascular: Regular rate and rhythm, no murmer, rub or gallops.  JVP not elevated, no edema.  Carotids within normal limits bilaterally, no bruits.  Respiratory: Normal respiratory effort.  Lungs clear, normal flow, no wheezing or crackles.  Breasts: Normal bilaterally.  No masses or lesions.  Nipples within normal limits.  No axillary lesions or nodes.  My M.A. Was present during this part of the examination.  Gastrointestinal: Normal active bowel sounds.   Soft, not tender, no masses, guarding or rebound.  No hepatosplenomegaly.   Musculoskeletal: extremities normal, no gross deformities noted.  Skin: no suspicious lesions or rashes   Neurologic: Mental status within normal limits.  Speech fluent.  No gross motor abnormalities and gait intact.  Psychiatric: mentation appears normal and affect normal.         Data:   Labs done last year        Assessment:   1. Normal complete physical exam  2. Anxiety, controlled  3. hcm         Plan:   Up to date tdap, hpv, pap  Exercise, diet      Cesario Urban M.D.          "

## 2019-10-01 ENCOUNTER — HEALTH MAINTENANCE LETTER (OUTPATIENT)
Age: 24
End: 2019-10-01

## 2020-03-04 ENCOUNTER — TRANSFERRED RECORDS (OUTPATIENT)
Dept: HEALTH INFORMATION MANAGEMENT | Facility: CLINIC | Age: 25
End: 2020-03-04

## 2020-03-18 ENCOUNTER — E-VISIT (OUTPATIENT)
Dept: FAMILY MEDICINE | Facility: CLINIC | Age: 25
End: 2020-03-18
Payer: OTHER GOVERNMENT

## 2020-03-18 DIAGNOSIS — Z53.9 ERRONEOUS ENCOUNTER--DISREGARD: Primary | ICD-10-CM

## 2020-03-18 ASSESSMENT — PATIENT HEALTH QUESTIONNAIRE - PHQ9
SUM OF ALL RESPONSES TO PHQ QUESTIONS 1-9: 16
10. IF YOU CHECKED OFF ANY PROBLEMS, HOW DIFFICULT HAVE THESE PROBLEMS MADE IT FOR YOU TO DO YOUR WORK, TAKE CARE OF THINGS AT HOME, OR GET ALONG WITH OTHER PEOPLE: SOMEWHAT DIFFICULT
SUM OF ALL RESPONSES TO PHQ QUESTIONS 1-9: 16

## 2020-03-18 ASSESSMENT — ANXIETY QUESTIONNAIRES
7. FEELING AFRAID AS IF SOMETHING AWFUL MIGHT HAPPEN: MORE THAN HALF THE DAYS
7. FEELING AFRAID AS IF SOMETHING AWFUL MIGHT HAPPEN: MORE THAN HALF THE DAYS
1. FEELING NERVOUS, ANXIOUS, OR ON EDGE: NEARLY EVERY DAY
2. NOT BEING ABLE TO STOP OR CONTROL WORRYING: NEARLY EVERY DAY
6. BECOMING EASILY ANNOYED OR IRRITABLE: NEARLY EVERY DAY
5. BEING SO RESTLESS THAT IT IS HARD TO SIT STILL: MORE THAN HALF THE DAYS
4. TROUBLE RELAXING: MORE THAN HALF THE DAYS
3. WORRYING TOO MUCH ABOUT DIFFERENT THINGS: NEARLY EVERY DAY
GAD7 TOTAL SCORE: 18

## 2020-03-19 ENCOUNTER — VIRTUAL VISIT (OUTPATIENT)
Dept: FAMILY MEDICINE | Facility: CLINIC | Age: 25
End: 2020-03-19
Payer: OTHER GOVERNMENT

## 2020-03-19 DIAGNOSIS — F41.9 ANXIETY DISORDER, UNSPECIFIED TYPE: Primary | ICD-10-CM

## 2020-03-19 PROCEDURE — 99441 ZZC PHYSICIAN TELEPHONE EVALUATION 5-10 MIN: CPT | Mod: 95 | Performed by: INTERNAL MEDICINE

## 2020-03-19 RX ORDER — SERTRALINE HYDROCHLORIDE 100 MG/1
100 TABLET, FILM COATED ORAL DAILY
Qty: 30 TABLET | Refills: 1 | Status: SHIPPED | OUTPATIENT
Start: 2020-03-19 | End: 2020-12-15

## 2020-03-19 ASSESSMENT — ANXIETY QUESTIONNAIRES: GAD7 TOTAL SCORE: 18

## 2020-03-19 ASSESSMENT — PATIENT HEALTH QUESTIONNAIRE - PHQ9
SUM OF ALL RESPONSES TO PHQ QUESTIONS 1-9: 16
SUM OF ALL RESPONSES TO PHQ QUESTIONS 1-9: 16

## 2020-03-19 NOTE — PROGRESS NOTES
"Shanita Negrete is a 24 year old female who is being evaluated via a telephone visit.      The patient has been notified of following (by RENNY Lamas CMA       \"We have found that certain health care needs can be provided without the need for a physical exam.  This service lets us provide the care you need with a short phone conversation.  If a prescription is necessary we can send it directly to your pharmacy.  If lab work is needed we can place an order for that and you can then stop by our lab to have the test done at a later time.    This telephone visit will be conducted via 3 way call with the you (the patient) , the physician/provider, and a me all on the line at the same time.  This allows your physician/provider to have the phone conversation with you while I will be taking notes for your medical record.  We will have full access to your [facility] medical record during this entire phone call.    Since this is like an office visit,  will bill your insurance company for this service.  Please check with your medical insurance if this type of telephone/virtual is covered . You may be responsible for the cost of this service if insurance coverage is denied.  The typical cost is $30 (10min), $59(11-20min) and $85 (21-30min)     If during the course of the call the physician/provider feels a telephone visit is not appropriate, you will not be charged for this service\"    Consent has been obtained for this service by care team member: yes.  See the scanned image in the medical record.    I called patient.  She notes anxiety issues, more then depression.  She lives at home and that is good but some stresses with that.  Having some panic attacks that can be hard to get under control quickly.  Is having daily anxiety, up and down.  Sleeping can be difficult, waking up frequently.  Work is fine, can be hard to focus but able to do well in her work.  Some depression but mild.  Not suicidal.  Is taking " her zoloft daily and has been for long time.  No drug use. Trying to get counseling set up.    I discussed with the patient her symptoms.  I think it would be appropriate to increase the dose of her Zoloft versus trying a different agent.  At this point we decided to increase the Zoloft but she will let me know if she is feeling worse at all.  If she has side effects she will also let me know.  I have also recommended counseling.    For now we will increase the dose of Zoloft to 100 mg and she will send me a [employer] message in 3 weeks.    Time approx 5:30    Cesario Urban M.D.

## 2020-03-19 NOTE — TELEPHONE ENCOUNTER
Provider E-Visit time total (minutes): cancelled and telephone visit scheduled.   Tamar Santa MA

## 2021-01-15 ENCOUNTER — HEALTH MAINTENANCE LETTER (OUTPATIENT)
Age: 26
End: 2021-01-15

## 2021-03-04 ENCOUNTER — VIRTUAL VISIT (OUTPATIENT)
Dept: FAMILY MEDICINE | Facility: CLINIC | Age: 26
End: 2021-03-04
Payer: OTHER GOVERNMENT

## 2021-03-04 DIAGNOSIS — Z00.00 ROUTINE HISTORY AND PHYSICAL EXAMINATION OF ADULT: Primary | ICD-10-CM

## 2021-03-04 DIAGNOSIS — F41.9 ANXIETY DISORDER, UNSPECIFIED TYPE: ICD-10-CM

## 2021-03-04 PROCEDURE — 99395 PREV VISIT EST AGE 18-39: CPT | Mod: 95 | Performed by: INTERNAL MEDICINE

## 2021-03-04 RX ORDER — SERTRALINE HYDROCHLORIDE 100 MG/1
TABLET, FILM COATED ORAL
Qty: 90 TABLET | Refills: 3 | Status: SHIPPED | OUTPATIENT
Start: 2021-03-04 | End: 2022-03-02

## 2021-03-04 RX ORDER — LORAZEPAM 0.5 MG/1
0.5 TABLET ORAL DAILY PRN
Qty: 20 TABLET | Refills: 0 | Status: SHIPPED | OUTPATIENT
Start: 2021-03-04

## 2021-03-04 SDOH — ECONOMIC STABILITY: FOOD INSECURITY: WITHIN THE PAST 12 MONTHS, YOU WORRIED THAT YOUR FOOD WOULD RUN OUT BEFORE YOU GOT MONEY TO BUY MORE.: NOT ASKED

## 2021-03-04 SDOH — ECONOMIC STABILITY: FOOD INSECURITY: WITHIN THE PAST 12 MONTHS, THE FOOD YOU BOUGHT JUST DIDN'T LAST AND YOU DIDN'T HAVE MONEY TO GET MORE.: NOT ASKED

## 2021-03-04 SDOH — ECONOMIC STABILITY: INCOME INSECURITY: HOW HARD IS IT FOR YOU TO PAY FOR THE VERY BASICS LIKE FOOD, HOUSING, MEDICAL CARE, AND HEATING?: NOT ASKED

## 2021-03-04 SDOH — ECONOMIC STABILITY: TRANSPORTATION INSECURITY
IN THE PAST 12 MONTHS, HAS THE LACK OF TRANSPORTATION KEPT YOU FROM MEDICAL APPOINTMENTS OR FROM GETTING MEDICATIONS?: NOT ASKED

## 2021-03-04 SDOH — ECONOMIC STABILITY: TRANSPORTATION INSECURITY
IN THE PAST 12 MONTHS, HAS LACK OF TRANSPORTATION KEPT YOU FROM MEETINGS, WORK, OR FROM GETTING THINGS NEEDED FOR DAILY LIVING?: NOT ASKED

## 2021-03-04 NOTE — PROGRESS NOTES
SUBJECTIVE:   CC: Shanita Negrete is an 25 year old woman who presents for preventive health visit.     {Split Bill scripting  The purpose of this visit is to discuss your medical history and prevent health problems before you are sick. You may be responsible for a co-pay, coinsurance, or deductible if your visit today includes services such as checking on a sore throat, having an x-ray or lab test, or treating and evaluating a new or existing condition :746877}  Patient has been advised of split billing requirements and indicates understanding: {Yes and No:138507}  HPI  {Add if <65 person on Medicare  - Required Questions (Optional):291760}  {Outside tests to abstract? :733590}    {additional problems to add (Optional):083957}    Today's PHQ-2 Score:   PHQ-2 ( 1999 Pfizer) 3/19/2020   Q1: Little interest or pleasure in doing things 2   Q2: Feeling down, depressed or hopeless 2   PHQ-2 Score 4       Abuse: Current or Past (Physical, Sexual or Emotional) - { :477459}  Do you feel safe in your environment? { :007624}    Have you ever done Advance Care Planning? (For example, a Health Directive, POLST, or a discussion with a medical provider or your loved ones about your wishes): { :840643}    Social History     Tobacco Use     Smoking status: Never Smoker     Smokeless tobacco: Never Used   Substance Use Topics     Alcohol use: Yes     Alcohol/week: 0.0 standard drinks     Comment: occ     {Rooming Staff- Complete this question if Prescreen response is not shown below for today's visit. If you drink alcohol do you typically have >3 drinks per day or >7 drinks per week? (Optional):153552}    Alcohol Use 8/5/2019   Prescreen: >3 drinks/day or >7 drinks/week? No   {add AUDIT responses (Optional) (A score of 7 for adult men is an indication of hazardous drinking; a score of 8 or more is an indication of an alcohol use disorder.  A score of 7 or more for adult women is an indication of hazardous drinking or an  "alchohol use disorder):858493}      Reviewed orders with patient.  Reviewed health maintenance and updated orders accordingly - { :140746::\"Yes\"}  {Chronicprobdata (optional):483578}        History of abnormal Pap smear: { :111684}  PAP / HPV 8/3/2018   PAP NIL     Reviewed and updated as needed this visit by clinical staff                 Reviewed and updated as needed this visit by Provider                {HISTORY OPTIONS (Optional):203001}    Review of Systems  {FEMALE ROS (Optional):730696}     OBJECTIVE:   There were no vitals taken for this visit.  Physical Exam  {Exam Choices (Optional):349877}    {Diagnostic Test Results (Optional):269316::\"Diagnostic Test Results:\",\"Labs reviewed in Epic\"}    ASSESSMENT/PLAN:   {Diag Picklist:266786}    Patient has been advised of split billing requirements and indicates understanding: {YES / NO:558872::\"Yes\"}  COUNSELING:  {FEMALE COUNSELING MESSAGES:675872::\"Reviewed preventive health counseling, as reflected in patient instructions\"}    Estimated body mass index is 24.91 kg/m  as calculated from the following:    Height as of 8/5/19: 1.619 m (5' 3.75\").    Weight as of 8/5/19: 65.3 kg (144 lb).    {Weight Management Plan (ACO) Complete if BMI is abnormal-  Ages 18-64  BMI >24.9.  Age 65+ with BMI <23 or >30 (Optional):723199}    She reports that she has never smoked. She has never used smokeless tobacco.      Counseling Resources:  ATP IV Guidelines  Pooled Cohorts Equation Calculator  Breast Cancer Risk Calculator  BRCA-Related Cancer Risk Assessment: FHS-7 Tool  FRAX Risk Assessment  ICSI Preventive Guidelines  Dietary Guidelines for Americans, 2010  USDA's MyPlate  ASA Prophylaxis  Lung CA Screening    Cesario Urban MD  [facility] [place] [facility]  "

## 2021-03-04 NOTE — PROGRESS NOTES
SUBJECTIVE:   CC: Shanita Negrete is an 25 year old woman who presents for preventive health visit.     Overall she is doing very well and is working out some.  She is single and has no kids.  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    She does have anxiety without depression.  As noted, she is on sertraline 100 mg and this works better than 50 mg.  For the most part her anxiety is well controlled but at times it can spike related to certain things.  About once or twice a month.  She does not feel like she is having depression.    She otherwise feels quite well.  She is sexually active with the same person for quite some time and is up-to-date in terms of STD testing.  She is up-to-date on her Pap smear.    Past Medical History:   Diagnosis Date     Acetabular labrum tear      Anxiety disorder     added zoloft 8/7/17     Past Surgical History:   Procedure Laterality Date     REMOVAL OF NAIL BED  2003     Social History     Socioeconomic History     Marital status: Single     Spouse name: Not on file     Number of children: 0     Years of education: Not on file     Highest education level: Not on file   Occupational History     Occupation: works cost analysis     Occupation:  for [employer]   Social Needs     Financial resource strain: Not on file     Food insecurity     Worry: Not on file     Inability: Not on file     Transportation needs     Medical: Not on file     Non-medical: Not on file   Tobacco Use     Smoking status: Never Smoker     Smokeless tobacco: Never Used   Substance and Sexual Activity     Alcohol use: Yes     Alcohol/week: 0.0 standard drinks     Comment: occ     Drug use: No     Sexual activity: Yes     Partners: Male     Birth control/protection: Pill   Lifestyle     Physical activity     Days per week: Not on file     Minutes per session: Not on file     Stress: Not on file   Relationships     Social connections      Talks on phone: Not on file     Gets together: Not on file     Attends Gnosticist service: Not on file     Active member of club or organization: Not on file     Attends meetings of clubs or organizations: Not on file     Relationship status: Not on file     Intimate partner violence     Fear of current or ex partner: Not on file     Emotionally abused: Not on file     Physically abused: Not on file     Forced sexual activity: Not on file   Other Topics Concern     Parent/sibling w/ CABG, MI or angioplasty before 65F 55M? Not Asked   Social History Narrative     Not on file     Current Outpatient Medications   Medication Sig Dispense Refill     LORazepam (ATIVAN) 0.5 MG tablet Take 1 tablet (0.5 mg) by mouth daily as needed for anxiety 20 tablet 0     sertraline (ZOLOFT) 100 MG tablet TAKE 1 TABLET BY MOUTH ONE TIME DAILY 90 tablet 3     VIENVA 0.1-20 MG-MCG tablet TAKE 1 TABLET BY MOUTH ONE TIME DAILY  84 tablet 1     Allergies   Allergen Reactions     Penicillins Hives     FAMILY HISTORY NOTED AND REVIEWED    REVIEW OF SYSTEMS: above    PHYSICAL EXAM    There were no vitals taken for this visit.    Labs from 2018 noted    ASSESSMENT:  1. Normal complete physical exam  2. Anxiety, mild, for the most part controlled  3. Health care maintenance    PLAN:  Up to date immunizations  Up to date pap  Continue zoloft, call if worsens  Ativan prn  Exercise,. diet        Patient has been advised of split billing requirements and indicates understanding: Yes  Healthy Habits:    Getting at least 3 servings of Calcium per day:  Yes    Bi-annual eye exam:  Yes    Dental care twice a year:  Yes    Sleep apnea or symptoms of sleep apnea:  None    Diet:  Regular (no restrictions)    Frequency of exercise:  4-5 days/week    Duration of exercise:  30-45 minutes    Taking medications regularly:  Yes    Barriers to taking medications:  None    Medication side effects:  None    PHQ-2 Total Score:    Additional concerns today:   No        Today's PHQ-2 Score:   PHQ-2 ( 1999 Pfizer) 3/19/2020   Q1: Little interest or pleasure in doing things 2   Q2: Feeling down, depressed or hopeless 2   PHQ-2 Score 4       Abuse: Current or Past (Physical, Sexual or Emotional) - No  Do you feel safe in your environment? Yes

## 2021-09-04 ENCOUNTER — HEALTH MAINTENANCE LETTER (OUTPATIENT)
Age: 26
End: 2021-09-04

## 2022-03-01 DIAGNOSIS — F41.9 ANXIETY DISORDER, UNSPECIFIED TYPE: ICD-10-CM

## 2022-03-01 NOTE — LETTER
[address], [address]  [facility] [address]  Phone: [phone]        March 2, 2022      Shanita Negrete                                                                                                                                [address] [facility] [address]          Drake Diehl,      It was noted on a recent refill that you are due for an appointment with Dr. Urban for a physical and medication check.  Please call as soon as you can  to schedule or schedule using MY CHART chart appt since Dr. Urban is booked out rather far.       A 90 day supply has been sent in.       Thank you,      The [place] team for  Cesario Urban MD/ sumi

## 2022-03-02 RX ORDER — SERTRALINE HYDROCHLORIDE 100 MG/1
TABLET, FILM COATED ORAL
Qty: 90 TABLET | Refills: 0 | Status: SHIPPED | OUTPATIENT
Start: 2022-03-02

## 2022-03-02 NOTE — TELEPHONE ENCOUNTER
Medication is being filled for 1 time refill only due to:  Patient needs to be seen because due for physical and med check.   Jacki DELA CRUZ RN  [facility]

## 2022-04-05 ENCOUNTER — VIRTUAL VISIT (OUTPATIENT)
Dept: PSYCHOLOGY | Facility: CLINIC | Age: 27
End: 2022-04-05
Payer: COMMERCIAL

## 2022-04-05 DIAGNOSIS — F41.9 ANXIETY DISORDER, UNSPECIFIED TYPE: Primary | ICD-10-CM

## 2022-04-05 DIAGNOSIS — F32.A DEPRESSION, UNSPECIFIED DEPRESSION TYPE: ICD-10-CM

## 2022-04-05 PROCEDURE — 90834 PSYTX W PT 45 MINUTES: CPT | Mod: 95

## 2022-04-05 ASSESSMENT — ANXIETY QUESTIONNAIRES
3. WORRYING TOO MUCH ABOUT DIFFERENT THINGS: NEARLY EVERY DAY
6. BECOMING EASILY ANNOYED OR IRRITABLE: MORE THAN HALF THE DAYS
1. FEELING NERVOUS, ANXIOUS, OR ON EDGE: NEARLY EVERY DAY
GAD7 TOTAL SCORE: 17
7. FEELING AFRAID AS IF SOMETHING AWFUL MIGHT HAPPEN: SEVERAL DAYS
GAD7 TOTAL SCORE: 17
4. TROUBLE RELAXING: NEARLY EVERY DAY
GAD7 TOTAL SCORE: 17
2. NOT BEING ABLE TO STOP OR CONTROL WORRYING: NEARLY EVERY DAY
5. BEING SO RESTLESS THAT IT IS HARD TO SIT STILL: MORE THAN HALF THE DAYS
7. FEELING AFRAID AS IF SOMETHING AWFUL MIGHT HAPPEN: SEVERAL DAYS

## 2022-04-05 ASSESSMENT — COLUMBIA-SUICIDE SEVERITY RATING SCALE - C-SSRS
3. HAVE YOU BEEN THINKING ABOUT HOW YOU MIGHT KILL YOURSELF?: NO
1. IN THE PAST MONTH, HAVE YOU WISHED YOU WERE DEAD OR WISHED YOU COULD GO TO SLEEP AND NOT WAKE UP?: YES
2. HAVE YOU ACTUALLY HAD ANY THOUGHTS OF KILLING YOURSELF IN THE PAST MONTH?: NO
4. HAVE YOU HAD THESE THOUGHTS AND HAD SOME INTENTION OF ACTING ON THEM?: NO
5. HAVE YOU STARTED TO WORK OUT OR WORKED OUT THE DETAILS OF HOW TO KILL YOURSELF? DO YOU INTEND TO CARRY OUT THIS PLAN?: NO
6. HAVE YOU EVER DONE ANYTHING, STARTED TO DO ANYTHING, OR PREPARED TO DO ANYTHING TO END YOUR LIFE?: NO

## 2022-04-05 NOTE — PROGRESS NOTES
[facility]   Mental Health & Addiction Services     Progress Note - Initial Visit    Patient  Name:  Shanita Negrete Date: 2022         Service Type: Individual     Visit Start Time:   Visit End Time:     Visit #: 1    Attendees: Client attended alone    Service Modality:  Video Visit:      Provider verified identity through the following two step process.  Patient provided:  Patient  and Patient address    Telemedicine Visit: The patient's condition can be safely assessed and treated via synchronous audio and visual telemedicine encounter.      Reason for Telemedicine Visit: Patient has requested telehealth visit    Originating Site (Patient Location): Patient's home    Distant Site (Provider Location): Provider Remote Setting- Home Office    Consent:  The patient/guardian has verbally consented to: the potential risks and benefits of telemedicine (video visit) versus in person care; bill my insurance or make self-payment for services provided; and responsibility for payment of non-covered services.     Patient would like the video invitation sent by:  My Chart    Mode of Communication:  Video Conference via [employer]    As the provider I attest to compliance with applicable laws and regulations related to telemedicine.       DATA:   Interactive Complexity: No   Crisis: No     Presenting Concerns/  Current Stressors:   Managing work stressors.    Coping with anxious feelings   Past suicidal ideation   Contextual influences affecting mental health   Depression        ASSESSMENT:  Mental Status Assessment:  Appearance:   Appropriate   Eye Contact:   Poor  Psychomotor Behavior: Normal   Attitude:   Cooperative   Orientation:   All  Speech   Rate / Production: Emotional   Volume:  Normal   Mood:    Depressed  Sad   Affect:    Tearful  Thought Content:  Clear   Thought Form:  Circumstantial  Insight:    Good       Safety Issues and Plan for Safety and Risk  Management:     [place] Suicide Severity Rating Scale (Lifetime/Recent)  [place] Suicide Severity Rating (Lifetime/Recent) 4/5/2022   Q1 Wished to be Dead (Past Month) yes   Q2 Suicidal Thoughts (Past Month) no   Q3 Suicidal Thought Method no   Q4 Suicidal Intent without Specific Plan no   Q5 Suicide Intent with Specific Plan no   Q6 Suicide Behavior (Lifetime) no   Level of Risk per Screen low risk     Patient denies current fears or concerns for personal safety.  Patient reports the following current or recent suicidal ideation or behaviors: One month ago, passive SI, fleeting, no plan. Wish to go to sleep and not wake up..  Patient denies current or recent homicidal ideation or behaviors.  Patient denies current or recent self injurious behavior or ideation.  Patient denies other safety concerns.  A safety and risk management plan has been developed including: Patient consented to co-developed safety plan on 4/05/2022.  Safety and risk management plan was reviewed.   Patient agreed to use safety plan should any safety concerns arise.  A copy was made available to the patient.  Patient reports there are firearms in the house. The firearms are secured in a locked space and Patient will call parents to come  the firearm today and remove from her home.     Diagnostic Criteria:  Unspecified Anxiety Disorder , Symptoms characteristic of an anxiety disorder that caused clinically significant distress or impairment in social, occupational, or other important areas of functioning predominate but do not meet the full criteria for any of the disorders of the anxiety disorders diagnostic class.  Major Depressive Disorder  A) Recurrent episode(s) - symptoms have been present during the same 2-week period and represent a change from previous functioning 5 or more symptoms (required for diagnosis)   - Depressed mood. Note: In children and adolescents, can be irritable mood.     - Diminished ability to think or  "concentrate, or indecisiveness.    - Recurrent thoughts of death (not just fear of dying), recurrent suicidal ideation without a specific plan, or a suicide attempt or a specific plan for committing suicide.   E) There has never been a manic episode or hypomanic episode      DSM5 Diagnoses: (Sustained by DSM5 Criteria Listed Above)  Diagnoses: 311 (F32.9) Unspecified Depressive Disorder   300.00 (F41.9) Unspecified Anxiety Disorder  Psychosocial & Contextual Factors: Past SI, work distress  WHODAS 2.0 (12 item):   WHODAS 2.0 Total Score 2022   Total Score 32   Total Score MyChart 32     Intervention:   Completed Safety plan  Collateral Reports Completed:  Not Applicable      PLAN: (Homework, other):  1. Provider will continue Diagnostic Assessment.  Patient was given the following to do until next session:  Use safety plan as needed. Patient will contact parents today to remove firearm from her home. Patient will share plan with roommate.     2. Provider recommended the following referrals: None at this time.      3.  Suicide Risk and Safety Concerns were assessed for Shanita Negrete.    Patient meets the following risk assessment and triage: When the patient identifies the following:  Wish to die    The following is recommended:   Complete/Review/Update Safety Plan    Safety Plan:  Adult Long Safety Plan:     [facility]                                       Shanita Casejerardo Leondhiraj     SAFETY PLAN:  Step 1: Warning signs / cues (Thoughts, images, mood, situation, behavior) that a crisis may be developing:    Thoughts: \"I don't matter\" and \"I'm a burden\"    Images: none    Thinking Processes: racing thoughts and highly critical and negative thoughts: work    Mood: worsening depression, intense worry and mood swings    Behaviors: isolating/withdrawing , can't stop crying and not taking care of myself    Situations: trauma  and work stress and     Step 2: Coping strategies - Things " I can do to take my mind off of my problems without contacting another person (relaxation technique, physical activity):    Distress Tolerance Strategies:  arts and crafts: making bracelets, play with my pet  and Axe throwing    Physical Activities: exercise: gym and deep breathing    Focus on helpful thoughts:  remind myself of what is important to me: my cats  Step 3: People and social settings that provide distraction:   Name: Akosua  Phone: Roommate    Name: Teddy Gibbons Phone: [phone]    coffee shop   Step 4: Remind myself of people and things that are important to me and worth living for:  My cats and my family and my roommate and friends. I know I matter.       Step 5: When I am in crisis, I can ask these people to help me use my safety plan:   Name: akosua  Phone: Roommate      Step 6: Making the environment safe:     remove access to firearms: patient will contact parents to remove gun from home and be around others  Step 7: Professionals or agencies I can contact during a crisis:    Suicide Prevention Lifeline: 3-564-071-NQLE (8047)    Crisis Text Line Service (available 24 hours a day, 7 days a week): Text [place] to 287223  Local Crisis Services: [facility] [phone]    Call 911 or go to my nearest emergency department.   I helped develop this safety plan and agree to use it when needed.  I have been given a copy of this plan.      Client signature _________________________________________________________________  Today s date:  4/5/2022  Completed by Provider Name/ Credentials:  GEORGI Zavala LGSW  April 5, 2022  Adapted from Safety Plan Template 2008 Cat Griffith and Gabe Johnson is reprinted with the express permission of the authors.  No portion of the Safety Plan Template may be reproduced without the express, written permission.  You can contact the authors at bhs@[email or web address] or [email or web address].              GEORGI Zavala LGSW  April 5, 2022      This note  has been reviewed and I agree with the plan of care. This note is co-signed by Katherine Bell, [facility] CareSW Supervisor, on: April 6, 2022

## 2022-04-06 ASSESSMENT — ANXIETY QUESTIONNAIRES: GAD7 TOTAL SCORE: 17

## 2022-04-12 ENCOUNTER — VIRTUAL VISIT (OUTPATIENT)
Dept: PSYCHOLOGY | Facility: CLINIC | Age: 27
End: 2022-04-12
Payer: COMMERCIAL

## 2022-04-12 DIAGNOSIS — F33.1 MODERATE EPISODE OF RECURRENT MAJOR DEPRESSIVE DISORDER (H): ICD-10-CM

## 2022-04-12 DIAGNOSIS — F41.1 GENERALIZED ANXIETY DISORDER: Primary | ICD-10-CM

## 2022-04-12 PROCEDURE — 90791 PSYCH DIAGNOSTIC EVALUATION: CPT | Mod: 95

## 2022-04-12 NOTE — PROGRESS NOTES
"[facility]  Provider Name:  Kiana Drummond     Credentials:  GEORGI, ABRAM    PATIENT'S NAME: Shanita Negrete  PREFERRED NAME: Shanita Whitfield  PRONOUNS:   she, her    MRN: [id number]  : 1995  ADDRESS: [address] [address]  ACCT. NUMBER:  [id number]  DATE OF SERVICE: 22  START TIME: 1530  END TIME: 1615  PREFERRED PHONE: [phone]  May we leave a program related message: Yes  SERVICE MODALITY:  Video Visit:      Provider verified identity through the following two step process.  Patient provided:  Patient  and Patient address    Telemedicine Visit: The patient's condition can be safely assessed and treated via synchronous audio and visual telemedicine encounter.      Reason for Telemedicine Visit: Patient has requested telehealth visit    Originating Site (Patient Location): Patient's home    Distant Site (Provider Location): Provider Remote Setting- Home Office    Consent:  The patient/guardian has verbally consented to: the potential risks and benefits of telemedicine (video visit) versus in person care; bill my insurance or make self-payment for services provided; and responsibility for payment of non-covered services.     Patient would like the video invitation sent by:  My Chart    Mode of Communication:  Video Conference via [employer]well    As the provider I attest to compliance with applicable laws and regulations related to telemedicine.    UNIVERSAL ADULT Mental Health DIAGNOSTIC ASSESSMENT    Identifying Information:  Patient is a 27 year old,  .  The pronoun use throughout this assessment reflects the patient's chosen pronoun.  Patient was referred for an assessment by self.  Patient attended the session alone.    Chief Complaint:   The reason for seeking services at this time is: \"Anxiety/depression\".  The problem(s) began 2016.    Patient has attempted to resolve these concerns in the past through medication therapy.    Social/Family " "History:  Patient reported they grew up in other [place], [place].  They were raised by biological parents  .  Parents were always together.  Patient reported that their childhood was \"not a great, in high school I didn't have a group to fit into. .  Patient described their current relationships with family of origin as Great relationship with mother, relationship with middle sister fair and younger sister good.    The patient describes their cultural background as .  Cultural influences and impact on patient's life structure, values, norms, and healthcare: None.  Contextual influences on patient's health include: Economic Factors recently loss FT job.    These factors will be addressed in the Preliminary Treatment plan. Patient identified their preferred language to be English. Patient reported they does not need the assistance of an  or other support involved in therapy.     Patient reported had no significant delays in developmental tasks.   Patient's highest education level was college graduate  .  Patient identified the following learning problems: none reported.  Modifications will not be used to assist communication in therapy.  Patient reports they are  able to understand written materials.    Patient reported the following relationship history \"not great. I seem to end end up with people without equal interest.   Patient's current relationship status is single for 4 months.   Patient identified their sexual orientation as bi-sexual.  Patient reported having   0 child(aracely). Patient identified parents; friends as part of their support system.  Patient identified the quality of these relationships as fair,  .      Patient's current living/housing situation involves staying in own home/apartment.  The immediate members of family and household include Edwige Negrete, 40,Mother and they report that housing is stable.    Patient is currently employed fulltime.  Patient reports their finances " "are obtained through employment. Patient does identify finances as a current stressor.      Patient reported that they have not been involved with the legal system.  Patient does not report being under probation/ parole/ jurisdiction. They are not under any current court jurisdiction. .    Patient's Strengths and Limitations:  Patient identified the following strengths or resources that will help them succeed in treatment: friends / good social support and family support. Things that may interfere with the patient's success in treatment include: \"myself\".     Assessments:  The following assessments were completed by patient for this visit:  PHQ9:   PHQ-9 SCORE 8/7/2017 3/18/2020 3/19/2020 12/17/2020   PHQ-9 Total Score MyChart - 16 (Moderately severe depression) - 18 (Moderately severe depression)   PHQ-9 Total Score 17 16 16 18     GAD7:   PORFIRIO-7 SCORE 3/18/2020 4/5/2022   Total Score 18 (severe anxiety) 17 (severe anxiety)   Total Score 18 17     PROMIS 10-Global Health (only subscores and total score):   PROMIS-10 Scores Only 4/5/2022   Global Mental Health Score 8   Global Physical Health Score 12   PROMIS TOTAL - SUBSCORES 20       Personal and Family Medical History:  Patient does not report a family history of mental health concerns.  Patient reports family history includes Hyperlipidemia in her father..     Patient does report Mental Health Diagnosis and/or Treatment.  Patient Patient reported the following previous diagnoses which include(s):  depression and anxiety  Patient reported symptoms began 2016.  Patient has received mental health services in the past:  PCP for medications .  Psychiatric Hospitalizations: no   Patient denies a history of civil commitment.  Currently, patient    PCP receiving other mental health services.  These include PCP for medication management.         Patient has had a physical exam to rule out medical causes for current symptoms.  Date of last physical exam was within the " past year. Client was encouraged to follow up with PCP if symptoms were to develop. The patient has a [facility] Provider, who is named Cesario Urban..  Patient reports no current medical and/or dental concerns.  Patient denies any issues with pain..   There are not significant appetite / nutritional concerns / weight changes.   Patient does not report a history of head injury / trauma / cognitive impairment.     Patient reports current meds as:   Outpatient Medications Marked as Taking for the 4/12/22 encounter (Appointment) with DrummondKiana Sig     sertraline (ZOLOFT) 100 MG tablet TAKE 1 TABLET BY MOUTH ONE TIME DAILY       Medication Adherence:  Patient reports not taking.  not taking psychiatric medications as prescribed. Client states reason for medication non-adherence as negative thoughts. Strategies for addressing obstacles to medication adherence include psychoeducation. Client accepted strategies to improve medication adherence.    Patient Allergies:    Allergies   Allergen Reactions     Penicillins Hives       Medical History:    Past Medical History:   Diagnosis Date     Acetabular labrum tear      Anxiety disorder     added zoloft 8/7/17         Current Mental Status Exam:   Appearance:  Appropriate    Eye Contact:  Fair   Psychomotor:  Normal       Gait / station:  Unable to assess due to phone visit  Attitude / Demeanor: Cooperative   Speech      Rate / Production: Emotional      Volume:  Normal  volume      Language:  intact  Mood:   Depressed  Sad  Grieving  Affect:   Tearful   Thought Content: Clear   Thought Process: Logical       Associations: No loosening of associations  Insight:   Good   Judgment:  Intact   Orientation:  All  Attention/concentration: Good      Substance Use:  Patient did not report a family history of substance use concerns; see medical history section for details.  Patient has not received chemical dependency treatment in the past.  Patient  has not ever been to detox.      Patient is not currently receiving any chemical dependency treatment.           Substance History of use Age of first use Date of last use     Pattern and duration of use (include amounts and frequency)   Alcohol currently use   22 4/3/2022 2-3 times per week, glass of wine with dinner   Cannabis   used in the past 25 9/22/2021 REPORTS SUBSTANCE USE: N/A     Amphetamines   never used     REPORTS SUBSTANCE USE: N/A   Cocaine/crack    never used       REPORTS SUBSTANCE USE: N/A   Hallucinogens never used         REPORTS SUBSTANCE USE: N/A   Inhalants never used         REPORTS SUBSTANCE USE: N/A   Heroin never used         REPORTS SUBSTANCE USE: N/A   Other Opiates never used     REPORTS SUBSTANCE USE: N/A   Benzodiazepine   never used     REPORTS SUBSTANCE USE: N/A   Barbiturates never used     REPORTS SUBSTANCE USE: N/A   Over the counter meds never used     REPORTS SUBSTANCE USE: N/A   Caffeine currently use 22   1 cup of coffee or energy drink per day   Nicotine  currently use 26 4/5/2022   vaping 5% daily    Other substances not listed above:  Identify:  never used     REPORTS SUBSTANCE USE: N/A     Patient reported the following problems as a result of their substance use: no problems, not applicable.    Substance Use: No symptoms    Based on the negative CAGE score and clinical interview there  are not indications of drug or alcohol abuse.      Significant Losses / Trauma / Abuse / Neglect Issues:   Patient did not  serve in the .  There are indications or report of significant loss, trauma, abuse or neglect issues related to: are no indications and client denies any losses, trauma, abuse, or neglect concerns.  Concerns for possible neglect are not present.     Safety Assessment:   Patient denies current homicidal ideation and behaviors.  Patient denies current self-injurious ideation and behaviors.    Patient denied risk behaviors associated with substance use.  Patient  denies any high risk behaviors associated with mental health symptoms.  Patient reports the following current concerns for their personal safety: None.  Patient reports there are firearms in the house.     yes, they are secured.     History of Safety Concerns:  Patient denied a history of homicidal ideation.     Patient denied a history of personal safety concerns.    Patient denied a history of assaultive behaviors.    Patient denied a history of sexual assault behaviors.     Patient denied a history of risk behaviors associated with substance use.  Patient denies any history of high risk behaviors associated with mental health symptoms.  Patient reports the following protective factors: forward or future oriented thinking; safe and stable environment; regular sleep; effectively controls impulses; sense of belonging; purpose; positive social skills; financial stability; strong sense of self worth or esteem    Risk Plan:  See Recommendations for Safety and Risk Management Plan    Review of Symptoms per patient report:  Depression: Change in sleep, Feelings of hopelessness, Feelings of helplessness, Low self-worth, Ruminations, Irritability, Feeling sad, down, or depressed, Withdrawn, Poor hygeine and Frequent crying  Bruna:  No Symptoms  Psychosis: No Symptoms  Anxiety: Nervousness, Physical complaints, such as headaches, stomachaches, muscle tension, Sleep disturbance, Poor concentration and Irritability  Panic:  No symptoms  Post Traumatic Stress Disorder:  No Symptoms   Eating Disorder: No Symptoms  ADD / ADHD:  No symptoms  Conduct Disorder: No symptoms  Autism Spectrum Disorder: No symptoms  Obsessive Compulsive Disorder: No Symptoms    Patient reports the following compulsive behaviors and treatment history: none.      Diagnostic Criteria:   Generalized Anxiety Disorder  A. Excessive anxiety and worry about a number of events or activities (such as work or school performance).   B. The person finds it  difficult to control the worry.   - Restlessness or feeling keyed up or on edge.    - Being easily fatigued.    - Difficulty concentrating or mind going blank.    - Irritability.    - Muscle tension.    - Sleep disturbance (difficulty falling or staying asleep, or restless unsatisfying sleep).   D. The focus of the anxiety and worry is not confined to features of an Axis I disorder.  E. The anxiety, worry, or physical symptoms cause clinically significant distress or impairment in social, occupational, or other important areas of functioning.   F. The disturbance is not due to the direct physiological effects of a substance (e.g., a drug of abuse, a medication) or a general medical condition (e.g., hyperthyroidism) and does not occur exclusively during a Mood Disorder, a Psychotic Disorder, or a Pervasive Developmental Disorder.    - The aformentioned symptoms began 6 year(s) ago and occurs 7 days per week and is experienced as moderate. Major Depressive Disorder  CRITERIA (A-C) REPRESENT A MAJOR DEPRESSIVE EPISODE - SELECT THESE CRITERIA  A) Recurrent episode(s) - symptoms have been present during the same 2-week period and represent a change from previous functioning 5 or more symptoms (required for diagnosis)   - Depressed mood. Note: In children and adolescents, can be irritable mood.     - Diminished interest or pleasure in all, or almost all, activities.    - Increased sleep.    - Fatigue or loss of energy.    - Diminished ability to think or concentrate, or indecisiveness.    - Recurrent thoughts of death (not just fear of dying), recurrent suicidal ideation without a specific plan, or a suicide attempt or a specific plan for committing suicide.   B) The symptoms cause clinically significant distress or impairment in social, occupational, or other important areas of functioning  C) The episode is not attributable to the physiological effects of a substance or to another medical condition  D) The occurence of  major depressive episode is not better explained by other thought / psychotic disorders  E) There has never been a manic episode or hypomanic episode    Functional Status:  Patient reports the following functional impairments:  management of the household and or completion of tasks, relationship(s) and self-care.     Nonprogrammatic care:  Patient is requesting basic services to address current mental health concerns.    Clinical Summary:  1. Reason for assessment: depression and anxiety.  2. Psychosocial, Cultural and Contextual Factors: Past SI, work distress .  3. Principal DSM5 Diagnoses  (Sustained by DSM5 Criteria Listed Above):   296.32 (F33.1) Major Depressive Disorder, Recurrent Episode, Moderate _  300.02 (F41.1) Generalized Anxiety Disorder.  4. Other Diagnoses that is relevant to services: Deferred  5. Provisional Diagnosis: deferred   6. Prognosis: Return to Normal Functioning.  7. Likely consequences of symptoms if not treated: Patient could see increase in symptoms and be in needed of higher level of care.  8. Client strengths include:  caring, insightful, motivated, open to learning, open to suggestions / feedback, support of family, friends and providers, wants to learn, willing to ask questions and work history .     Recommendations:     1. Plan for Safety and Risk Management:   A safety and risk management plan has been developed including: Patient consented to co-developed safety plan on 4/05/2022.  Safety and risk management plan was reviewed.   Patient agreed to use safety plan should any safety concerns arise.  A copy was made available to the patient..          Report to child / adult protection services was NA.     2. Patient's identified none.     3. Initial Treatment will focus on:    Depressed Mood - suicidal ideations, self worth, self esteem   Anxiety - excessive wworry.     4. Resources/Service Plan:    services are not indicated.   Modifications to assist communication are  not indicated.   Additional disability accommodations are not indicated.      5. Collaboration:   Collaboration / coordination of treatment will be initiated with the following  support professionals: primary care physician.      6.  Referrals:   The following referral(s) will be initiated: [facility]patient [facility]. Next Scheduled Appointment: 4/19/2022.     A Release of Information has been obtained for the following: verbal as needed.    7. NATACHA:    NATACHA:  Discussed the general effects of drugs and alcohol on health and well-being. Provider gave patient printed information about the effects of chemical use on their health and well being. Recommendations:  Sobriety.     8. Records:   These were reviewed at time of assessment.   Information in this assessment was obtained from the medical record and  provided by patient who is a good historian.    Patient will have open access to their mental health medical record.        Provider Name/ Credentials:  GEORGI Zavala, CELENA  April 12, 2022    This note has been reviewed and I agree with the plan of care. This note is co-signed by [facility] Supervisor, on: April 14, 2022

## 2022-04-16 ENCOUNTER — HEALTH MAINTENANCE LETTER (OUTPATIENT)
Age: 27
End: 2022-04-16

## 2022-04-19 ENCOUNTER — VIRTUAL VISIT (OUTPATIENT)
Dept: PSYCHOLOGY | Facility: CLINIC | Age: 27
End: 2022-04-19
Payer: COMMERCIAL

## 2022-04-19 DIAGNOSIS — F41.1 GENERALIZED ANXIETY DISORDER: Primary | ICD-10-CM

## 2022-04-19 DIAGNOSIS — F33.1 MODERATE EPISODE OF RECURRENT MAJOR DEPRESSIVE DISORDER (H): ICD-10-CM

## 2022-04-19 PROCEDURE — 90834 PSYTX W PT 45 MINUTES: CPT | Mod: 95

## 2022-04-19 NOTE — PROGRESS NOTES
[facility]                                     Progress Note    Patient Name: Shanita Negrete  Date: 2022         Service Type: Individual      Session Start Time:   Session End Time:      Session Length: 38-52    Session #: 2    Attendees: Client attended alone    Service Modality:  Video Visit:      Provider verified identity through the following two step process.  Patient provided:  Patient  and Patient is known previously to provider    Telemedicine Visit: The patient's condition can be safely assessed and treated via synchronous audio and visual telemedicine encounter.      Reason for Telemedicine Visit: Patient has requested telehealth visit    Originating Site (Patient Location): Patient's home    Distant Site (Provider Location): Provider Remote Setting- Home Office    Consent:  The patient/guardian has verbally consented to: the potential risks and benefits of telemedicine (video visit) versus in person care; bill my insurance or make self-payment for services provided; and responsibility for payment of non-covered services.     Patient would like the video invitation sent by:  My Chart    Mode of Communication:  Video Conference via Amwell    As the provider I attest to compliance with applicable laws and regulations related to telemedicine.    DATA  Interactive Complexity: No  Crisis: No        Progress Since Last Session (Related to Symptoms / Goals / Homework):   Symptoms: Improving per patient report    Homework: Achieved / completed to satisfaction      Episode of Care Goals: Minimal progress - ACTION (Actively working towards change); Intervened by reinforcing change plan / affirming steps taken     Current / Ongoing Stressors and Concerns:   Managing work stressors.               Coping with anxious feelings              Past suicidal ideation              Contextual influences affecting mental health              Depression         Treatment  Objective(s) Addressed in This Session:   Co develop treatment plan   Consent to treatment plan        Intervention:   CBT: Met with patient to co develop treatment plan. Provided active listening as patient gave brief overview of her week. Patient processed communication concerns with roommate. Therapist introduced I statements and provided examples. Guided patient through examples. Encouraged patient to practice in mirror.        Assessments completed prior to visit:  The following assessments were completed by patient for this visit:  PHQ2:   PHQ-2 ( 1999 [employer]) 4/5/2022 3/4/2021 3/19/2020 8/5/2019 8/3/2018 8/4/2016 7/6/2015   Q1: Little interest or pleasure in doing things 1 0 2 0 0 0 0   Q2: Feeling down, depressed or hopeless 1 0 2 0 0 0 0   PHQ-2 Score 2 0 4 0 0 0 0   PHQ-2 Total Score (12-17 Years)- Positive if 3 or more points; Administer PHQ-A if positive - 0 4 0 - - -   Q1: Little interest or pleasure in doing things Several days - - - - - -   Q2: Feeling down, depressed or hopeless Several days - - - - - -   PHQ-2 Score 2 - - - - - -     PHQ9:   PHQ-9 SCORE 8/7/2017 3/18/2020 3/19/2020 12/17/2020   PHQ-9 Total Score MyChart - 16 (Moderately severe depression) - 18 (Moderately severe depression)   PHQ-9 Total Score 17 16 16 18     GAD2:   PORFIRIO-2 4/5/2022   Feeling nervous, anxious, or on edge 3   Not being able to stop or control worrying 3   PORFIRIO-2 Total Score 6     GAD7:   PORFIRIO-7 SCORE 3/18/2020 4/5/2022   Total Score 18 (severe anxiety) 17 (severe anxiety)   Total Score 18 17     PROMIS 10-Global Health (only subscores and total score):   PROMIS-10 Scores Only 4/5/2022   Global Mental Health Score 8   Global Physical Health Score 12   PROMIS TOTAL - SUBSCORES 20         ASSESSMENT: Current Emotional / Mental Status (status of significant symptoms):   Risk status (Self / Other harm or suicidal ideation)   Patient denies current fears or concerns for personal safety.   Patient denies current or recent  "suicidal ideation or behaviors.   Patient denies current or recent homicidal ideation or behaviors.   Patient denies current or recent self injurious behavior or ideation.   Patient denies other safety concerns.   Patient reports there has been no change in risk factors since their last session.     Patient reports there has been no change in protective factors since their last session.     A safety and risk management plan has been developed including: Patient consented to co-developed safety plan on 4/05/2022.  Safety and risk management plan was reviewed.   Patient agreed to use safety plan should any safety concerns arise.  A copy was made available to the patient.     Appearance:   Appropriate    Eye Contact:   Good    Psychomotor Behavior: Normal    Attitude:   Cooperative  Pleasant   Orientation:   All   Speech    Rate / Production: Emotional    Volume:  Normal    Mood:    Depressed    Affect:    Appropriate  Labile    Thought Content:  Clear    Thought Form:  Coherent  Logical    Insight:    Good      Medication Review:   No changes to current psychiatric medication(s)     Medication Compliance:   Yes     Changes in Health Issues:   None reported     Chemical Use Review:   Substance Use: Chemical use reviewed, no active concerns identified      Tobacco Use: No current tobacco use.      Diagnosis:  1. Generalized anxiety disorder    2. Moderate episode of recurrent major depressive disorder (H)        Collateral Reports Completed:   Not Applicable    PLAN: (Patient Tasks / Therapist Tasks / Other)  Patient will practice using \"I statements.\"        GEORGI Zavala, SW    This note has been reviewed and I agree with the plan of care. This note is co-signed by Katherine Bell [facility] Super[facility], on: April 21, 2022                                                        ______________________________________________________________________    Individual Treatment Plan    Patient's Name: Shanita Roca " "Caden  YOB: 1995    Date of Creation: 4/19/2022  Date Treatment Plan Last Reviewed/Revised: n/a    DSM5 Diagnoses: 296.32 (F33.1) Major Depressive Disorder, Recurrent Episode, Moderate _ or 300.02 (F41.1) Generalized Anxiety Disorder  Psychosocial / Contextual Factors: Past SI, work distress  PROMIS (reviewed every 90 days): 4/05/2022 score 20    Referral / Collaboration:  Referral to another professional/service is not indicated at this time..    Anticipated number of session for this episode of care: 12-15  Anticipation frequency of session: Weekly  Anticipated Duration of each session: 38-52 minutes  Treatment plan will be reviewed in 90 days or when goals have been changed.       MeasurableTreatment Goal(s) related to diagnosis / functional impairment(s)  Goal 1: Patient will learn affective communication skills and ways to manage anxiety and depression.  \"I will know I've met my goal when I can have difficult conversation without shutting down and I will have motivation to sort laundry basket and wash clothes.         Objective #A (Patient Action)    Patient will use at least 4 coping skills for anxiety management in the next 4 weeks.  Status: New - Date: 4/21/2022     Intervention(s)  Therapist will teach emotional regulation skills. Therapist will provide psychoeducation on anxiety and teach skills to manage anxiety..    Objective #B  Patient will Decrease frequency and intensity of feeling down, depressed, hopeless.  Status: New - Date: 4/21/2022     Intervention(s)  Therapist will assign homework using coping skills for depression  provide educational materials on characteristics of depression  teach emotional regulation skills. Provide psychoeducation on depression and teach healthy coping skills to manage depression..    Objective #C  Patient will learn & utilize at least 2 assertive communication skills weekly.  Status: New - Date: 4/21/2022     Intervention(s)  Therapist will assign " homework using assertive communication skills.  teach affective communication skills. .      Patient has reviewed and agreed to the above plan.      GEORGI Zavala, LGSW  April 19, 2022    This note has been reviewed and I agree with the treatment plan of care. This note is co-signed by Katherine Bell [facility] Super[facility], on: April 21, 2022

## 2022-04-27 ENCOUNTER — VIRTUAL VISIT (OUTPATIENT)
Dept: PSYCHOLOGY | Facility: CLINIC | Age: 27
End: 2022-04-27
Payer: COMMERCIAL

## 2022-04-27 DIAGNOSIS — F33.1 MODERATE EPISODE OF RECURRENT MAJOR DEPRESSIVE DISORDER (H): ICD-10-CM

## 2022-04-27 DIAGNOSIS — F41.1 GENERALIZED ANXIETY DISORDER: Primary | ICD-10-CM

## 2022-04-27 PROCEDURE — 90834 PSYTX W PT 45 MINUTES: CPT | Mod: 95

## 2022-04-27 ASSESSMENT — PATIENT HEALTH QUESTIONNAIRE - PHQ9: SUM OF ALL RESPONSES TO PHQ QUESTIONS 1-9: 5

## 2022-04-27 NOTE — PROGRESS NOTES
[facility]                                     Progress Note    Patient Name: Shanita Negrete  Date: 2022         Service Type: Individual      Session Start Time:   Session End Time:      Session Length: 38-52    Session #: 3    Attendees: Client attended alone    Service Modality:  Video Visit:      Provider verified identity through the following two step process.  Patient provided:  Patient  and Patient is known previously to provider    Telemedicine Visit: The patient's condition can be safely assessed and treated via synchronous audio and visual telemedicine encounter.      Reason for Telemedicine Visit: Patient has requested telehealth visit    Originating Site (Patient Location): Patient's home    Distant Site (Provider Location): Provider Remote Setting- Home Office    Consent:  The patient/guardian has verbally consented to: the potential risks and benefits of telemedicine (video visit) versus in person care; bill my insurance or make self-payment for services provided; and responsibility for payment of non-covered services.     Patient would like the video invitation sent by:  My Chart    Mode of Communication:  Video Conference via Amwell    As the provider I attest to compliance with applicable laws and regulations related to telemedicine.    DATA  Interactive Complexity: No  Crisis: No        Progress Since Last Session (Related to Symptoms / Goals / Homework):   Symptoms: Improving per PHQ-9 and patient report    Homework: Achieved / completed to satisfaction      Episode of Care Goals: Satisfactory progress - ACTION (Actively working towards change); Intervened by reinforcing change plan / affirming steps taken     Current / Ongoing Stressors and Concerns:   Managing work stressors.               Coping with anxious feelings              Past suicidal ideation              Contextual influences affecting mental health              Depression          Treatment Objective(s) Addressed in This Session:   use cognitive strategies identified in therapy to challenge anxious thoughts         Intervention:   CBT: Met with patient to review goals and interventions. Provided active listening as patient gave a brief update on her week. Patient was tearful as she described the support she is receiving from family. Patient has been actively applying for jobs and has been optimistic. Completed PHQ-9 in session. Provided psychoeducation on mindfulness as a tool to accept uncomfortable feelings. Suggested making larger task into smaller ones as a strategy to help with motivation. In addition recommended setting a timer for 10 minutes to fold clothes as this has been an ongoing struggle. Discussed insurance concerns gave referral to use [facility] as a bridge as needed as patient is trying to re-established health care insurance.     Assessments completed prior to visit:  The following assessments were completed by patient for this visit:  PHQ2:   PHQ-2 ( 1999 [employer]) 4/5/2022 3/4/2021 3/19/2020 8/5/2019 8/3/2018 8/4/2016 7/6/2015   Q1: Little interest or pleasure in doing things 1 0 2 0 0 0 0   Q2: Feeling down, depressed or hopeless 1 0 2 0 0 0 0   PHQ-2 Score 2 0 4 0 0 0 0   PHQ-2 Total Score (12-17 Years)- Positive if 3 or more points; Administer PHQ-A if positive - 0 4 0 - - -   Q1: Little interest or pleasure in doing things Several days - - - - - -   Q2: Feeling down, depressed or hopeless Several days - - - - - -   PHQ-2 Score 2 - - - - - -     PHQ9:   PHQ-9 SCORE 8/7/2017 3/18/2020 3/19/2020 12/17/2020 4/27/2022   PHQ-9 Total Score MyChart - 16 (Moderately severe depression) - 18 (Moderately severe depression) -   PHQ-9 Total Score 17 16 16 18 5     GAD2:   PORFIRIO-2 4/5/2022   Feeling nervous, anxious, or on edge 3   Not being able to stop or control worrying 3   PORFIRIO-2 Total Score 6     GAD7:   PORFIRIO-7 SCORE 3/18/2020 4/5/2022   Total Score 18 (severe  anxiety) 17 (severe anxiety)   Total Score 18 17     PROMIS 10-Global Health (only subscores and total score):   PROMIS-10 Scores Only 4/5/2022   Global Mental Health Score 8   Global Physical Health Score 12   PROMIS TOTAL - SUBSCORES 20         ASSESSMENT: Current Emotional / Mental Status (status of significant symptoms):   Risk status (Self / Other harm or suicidal ideation)   Patient denies current fears or concerns for personal safety.   Patient denies current or recent suicidal ideation or behaviors.   Patient denies current or recent homicidal ideation or behaviors.   Patient denies current or recent self injurious behavior or ideation.   Patient denies other safety concerns.   Patient reports there has been no change in risk factors since their last session.     Patient reports there has been no change in protective factors since their last session.     A safety and risk management plan has been developed including: Patient consented to co-developed safety plan on 4/05/2022.  Safety and risk management plan was reviewed.   Patient agreed to use safety plan should any safety concerns arise.  A copy was made available to the patient.     Appearance:   Appropriate    Eye Contact:   Good    Psychomotor Behavior: Normal    Attitude:   Cooperative  Interested Friendly Pleasant   Orientation:   All   Speech    Rate / Production: Emotional    Volume:  Normal    Mood:    Normal   Affect:    Appropriate  Labile    Thought Content:  Clear    Thought Form:  Coherent  Logical    Insight:    Good      Medication Review:   No changes to current psychiatric medication(s)     Medication Compliance:   Yes     Changes in Health Issues:   None reported     Chemical Use Review:   Substance Use: Chemical use reviewed, no active concerns identified      Tobacco Use: No current tobacco use.      Diagnosis:  1. Generalized anxiety disorder    2. Moderate episode of recurrent major depressive disorder (H)        Collateral Reports  "Completed:   Not Applicable    PLAN: (Patient Tasks / Therapist Tasks / Other)  Patient will practice cognitive restructuring, urge surfing, affirmations and safety plan as  tools for emotional management. Patient will use [facility] to bridge support as needed. Patient will contact intake to schedule upon insurance renewal.         GEORGI Zavala, SW       This note has been reviewed and I agree with the plan of care. This note is co-signed by Katherine Bell [facility] Supervisor, on: April 28, 2022                                                       ______________________________________________________________________    Individual Treatment Plan    Patient's Name: Shanita Negrete  YOB: 1995    Date of Creation: 4/19/2022  Date Treatment Plan Last Reviewed/Revised: n/a    DSM5 Diagnoses: 296.32 (F33.1) Major Depressive Disorder, Recurrent Episode, Moderate _ or 300.02 (F41.1) Generalized Anxiety Disorder  Psychosocial / Contextual Factors: Past SI, work distress  PROMIS (reviewed every 90 days): 4/05/2022 score 20    Referral / Collaboration:  Referral to another professional/service is not indicated at this time..    Anticipated number of session for this episode of care: 12-15  Anticipation frequency of session: Weekly  Anticipated Duration of each session: 38-52 minutes  Treatment plan will be reviewed in 90 days or when goals have been changed.       MeasurableTreatment Goal(s) related to diagnosis / functional impairment(s)  Goal 1: Patient will learn affective communication skills and ways to manage anxiety and depression.  \"I will know I've met my goal when I can have difficult conversation without shutting down and I will have motivation to sort laundry basket and wash clothes.         Objective #A (Patient Action)    Patient will use at least 4 coping skills for anxiety management in the next 4 weeks.  Status: New - Date: 4/21/2022 "     Intervention(s)  Therapist will teach emotional regulation skills. Therapist will provide psychoeducation on anxiety and teach skills to manage anxiety..    Objective #B  Patient will Decrease frequency and intensity of feeling down, depressed, hopeless.  Status: New - Date: 4/21/2022     Intervention(s)  Therapist will assign homework using coping skills for depression  provide educational materials on characteristics of depression  teach emotional regulation skills. Provide psychoeducation on depression and teach healthy coping skills to manage depression..    Objective #C  Patient will learn & utilize at least 2 assertive communication skills weekly.  Status: New - Date: 4/21/2022     Intervention(s)  Therapist will assign homework using assertive communication skills.  teach affective communication skills. .      Patient has reviewed and agreed to the above plan.      GEORGI Zavala, LGSW  April 19, 2022    This note has been reviewed and I agree with the treatment plan of care. This note is co-signed by Katherine Bell [facility] Supervisor, on: April 21, 2022

## 2022-10-16 ENCOUNTER — HEALTH MAINTENANCE LETTER (OUTPATIENT)
Age: 27
End: 2022-10-16

## 2023-06-01 ENCOUNTER — HEALTH MAINTENANCE LETTER (OUTPATIENT)
Age: 28
End: 2023-06-01

## 2024-08-10 ENCOUNTER — HEALTH MAINTENANCE LETTER (OUTPATIENT)
Age: 29
End: 2024-08-10